# Patient Record
Sex: FEMALE | Race: WHITE | Employment: UNEMPLOYED
[De-identification: names, ages, dates, MRNs, and addresses within clinical notes are randomized per-mention and may not be internally consistent; named-entity substitution may affect disease eponyms.]

---

## 2017-01-23 ENCOUNTER — NURSE ONLY (OUTPATIENT)
Dept: OBGYN | Facility: CLINIC | Age: 22
End: 2017-01-23

## 2017-01-23 VITALS
DIASTOLIC BLOOD PRESSURE: 70 MMHG | HEART RATE: 78 BPM | HEIGHT: 66 IN | WEIGHT: 152 LBS | SYSTOLIC BLOOD PRESSURE: 118 MMHG | RESPIRATION RATE: 18 BRPM | BODY MASS INDEX: 24.43 KG/M2

## 2017-01-23 DIAGNOSIS — N94.6 DYSMENORRHEA: Primary | ICD-10-CM

## 2017-01-23 PROCEDURE — 96372 THER/PROPH/DIAG INJ SC/IM: CPT | Performed by: ADVANCED PRACTICE MIDWIFE

## 2017-01-23 RX ORDER — MEDROXYPROGESTERONE ACETATE 150 MG/ML
150 INJECTION, SUSPENSION INTRAMUSCULAR ONCE
Status: COMPLETED | OUTPATIENT
Start: 2017-01-23 | End: 2017-01-23

## 2017-01-23 RX ADMIN — MEDROXYPROGESTERONE ACETATE 150 MG: 150 INJECTION, SUSPENSION INTRAMUSCULAR at 16:43

## 2017-08-09 ENCOUNTER — NURSE ONLY (OUTPATIENT)
Dept: OBGYN | Age: 22
End: 2017-08-09
Payer: COMMERCIAL

## 2017-08-09 VITALS
WEIGHT: 148 LBS | BODY MASS INDEX: 23.78 KG/M2 | SYSTOLIC BLOOD PRESSURE: 108 MMHG | DIASTOLIC BLOOD PRESSURE: 65 MMHG | HEIGHT: 66 IN

## 2017-08-09 DIAGNOSIS — N94.6 DYSMENORRHEA: Primary | ICD-10-CM

## 2017-08-09 LAB
CONTROL: PRESENT
PREGNANCY TEST URINE, POC: NEGATIVE

## 2017-08-09 PROCEDURE — 81025 URINE PREGNANCY TEST: CPT | Performed by: OBSTETRICS & GYNECOLOGY

## 2017-08-09 PROCEDURE — 96372 THER/PROPH/DIAG INJ SC/IM: CPT | Performed by: OBSTETRICS & GYNECOLOGY

## 2017-08-09 RX ORDER — MEDROXYPROGESTERONE ACETATE 150 MG/ML
150 INJECTION, SUSPENSION INTRAMUSCULAR ONCE
Status: COMPLETED | OUTPATIENT
Start: 2017-08-09 | End: 2017-08-09

## 2017-08-09 RX ADMIN — MEDROXYPROGESTERONE ACETATE 150 MG: 150 INJECTION, SUSPENSION INTRAMUSCULAR at 14:03

## 2017-08-12 ENCOUNTER — APPOINTMENT (OUTPATIENT)
Dept: CT IMAGING | Age: 22
End: 2017-08-12
Payer: COMMERCIAL

## 2017-08-12 ENCOUNTER — HOSPITAL ENCOUNTER (EMERGENCY)
Age: 22
Discharge: HOME | End: 2017-08-12
Attending: EMERGENCY MEDICINE
Payer: COMMERCIAL

## 2017-08-12 VITALS
TEMPERATURE: 99.6 F | SYSTOLIC BLOOD PRESSURE: 111 MMHG | HEART RATE: 85 BPM | RESPIRATION RATE: 18 BRPM | OXYGEN SATURATION: 97 % | DIASTOLIC BLOOD PRESSURE: 85 MMHG

## 2017-08-12 DIAGNOSIS — G44.319 ACUTE POST-TRAUMATIC HEADACHE, NOT INTRACTABLE: Primary | ICD-10-CM

## 2017-08-12 PROCEDURE — 96374 THER/PROPH/DIAG INJ IV PUSH: CPT

## 2017-08-12 PROCEDURE — 70450 CT HEAD/BRAIN W/O DYE: CPT

## 2017-08-12 PROCEDURE — 96375 TX/PRO/DX INJ NEW DRUG ADDON: CPT

## 2017-08-12 PROCEDURE — 99284 EMERGENCY DEPT VISIT MOD MDM: CPT

## 2017-08-12 PROCEDURE — 6360000002 HC RX W HCPCS: Performed by: EMERGENCY MEDICINE

## 2017-08-12 RX ORDER — KETOROLAC TROMETHAMINE 30 MG/ML
30 INJECTION, SOLUTION INTRAMUSCULAR; INTRAVENOUS ONCE
Status: COMPLETED | OUTPATIENT
Start: 2017-08-12 | End: 2017-08-12

## 2017-08-12 RX ORDER — DIPHENHYDRAMINE HYDROCHLORIDE 50 MG/ML
25 INJECTION INTRAMUSCULAR; INTRAVENOUS ONCE
Status: COMPLETED | OUTPATIENT
Start: 2017-08-12 | End: 2017-08-12

## 2017-08-12 RX ADMIN — DIPHENHYDRAMINE HYDROCHLORIDE 25 MG: 50 INJECTION, SOLUTION INTRAMUSCULAR; INTRAVENOUS at 12:08

## 2017-08-12 RX ADMIN — KETOROLAC TROMETHAMINE 30 MG: 30 INJECTION, SOLUTION INTRAMUSCULAR at 12:08

## 2017-08-12 RX ADMIN — PROCHLORPERAZINE EDISYLATE 10 MG: 5 INJECTION INTRAMUSCULAR; INTRAVENOUS at 12:08

## 2017-08-12 ASSESSMENT — PAIN DESCRIPTION - LOCATION: LOCATION: HEAD

## 2017-08-12 ASSESSMENT — PAIN SCALES - GENERAL
PAINLEVEL_OUTOF10: 6
PAINLEVEL_OUTOF10: 6

## 2017-08-12 ASSESSMENT — PAIN DESCRIPTION - PAIN TYPE: TYPE: ACUTE PAIN

## 2018-03-20 ENCOUNTER — TELEPHONE (OUTPATIENT)
Dept: OBGYN | Age: 23
End: 2018-03-20

## 2018-03-20 DIAGNOSIS — Z32.01 POSITIVE URINE PREGNANCY TEST: Primary | ICD-10-CM

## 2018-04-19 ENCOUNTER — HOSPITAL ENCOUNTER (OUTPATIENT)
Age: 23
Setting detail: SPECIMEN
Discharge: HOME | End: 2018-04-19
Payer: COMMERCIAL

## 2018-04-19 ENCOUNTER — INITIAL PRENATAL (OUTPATIENT)
Dept: OBGYN | Age: 23
End: 2018-04-19
Payer: COMMERCIAL

## 2018-04-19 VITALS
HEART RATE: 88 BPM | DIASTOLIC BLOOD PRESSURE: 62 MMHG | BODY MASS INDEX: 27.44 KG/M2 | SYSTOLIC BLOOD PRESSURE: 102 MMHG | WEIGHT: 170 LBS

## 2018-04-19 DIAGNOSIS — O21.9 NAUSEA AND VOMITING DURING PREGNANCY: Primary | ICD-10-CM

## 2018-04-19 DIAGNOSIS — N91.2 AMENORRHEA: ICD-10-CM

## 2018-04-19 DIAGNOSIS — Z32.01 POSITIVE URINE PREGNANCY TEST: ICD-10-CM

## 2018-04-19 DIAGNOSIS — Z34.81 ENCOUNTER FOR SUPERVISION OF OTHER NORMAL PREGNANCY IN FIRST TRIMESTER: ICD-10-CM

## 2018-04-19 LAB
ABO/RH: NORMAL
AMPHETAMINE SCREEN URINE: NEGATIVE
ANTIBODY SCREEN: NEGATIVE
BARBITURATE SCREEN URINE: NEGATIVE
BENZODIAZEPINE SCREEN, URINE: NEGATIVE
BUPRENORPHINE URINE: NEGATIVE
CANNABINOID SCREEN URINE: NEGATIVE
COCAINE METABOLITE, URINE: NEGATIVE
CONTROL: ABNORMAL
MDMA URINE: NORMAL
METHADONE SCREEN, URINE: NEGATIVE
METHAMPHETAMINE, URINE: NEGATIVE
OPIATES, URINE: NEGATIVE
OXYCODONE SCREEN URINE: NEGATIVE
PHENCYCLIDINE, URINE: NEGATIVE
PREGNANCY TEST URINE, POC: POSITIVE
PROPOXYPHENE, URINE: NEGATIVE
TEST INFORMATION: NORMAL
TRICYCLIC ANTIDEPRESSANTS, UR: NEGATIVE

## 2018-04-19 PROCEDURE — 87591 N.GONORRHOEAE DNA AMP PROB: CPT

## 2018-04-19 PROCEDURE — 87389 HIV-1 AG W/HIV-1&-2 AB AG IA: CPT

## 2018-04-19 PROCEDURE — 87086 URINE CULTURE/COLONY COUNT: CPT

## 2018-04-19 PROCEDURE — 87340 HEPATITIS B SURFACE AG IA: CPT

## 2018-04-19 PROCEDURE — 59899 UNLISTED PX MAT CARE&DLVR: CPT | Performed by: ADVANCED PRACTICE MIDWIFE

## 2018-04-19 PROCEDURE — 86780 TREPONEMA PALLIDUM: CPT

## 2018-04-19 PROCEDURE — 86803 HEPATITIS C AB TEST: CPT

## 2018-04-19 PROCEDURE — 86901 BLOOD TYPING SEROLOGIC RH(D): CPT

## 2018-04-19 PROCEDURE — 99211 OFF/OP EST MAY X REQ PHY/QHP: CPT | Performed by: ADVANCED PRACTICE MIDWIFE

## 2018-04-19 PROCEDURE — 80306 DRUG TEST PRSMV INSTRMNT: CPT

## 2018-04-19 PROCEDURE — 86762 RUBELLA ANTIBODY: CPT

## 2018-04-19 PROCEDURE — 86850 RBC ANTIBODY SCREEN: CPT

## 2018-04-19 PROCEDURE — 81025 URINE PREGNANCY TEST: CPT | Performed by: ADVANCED PRACTICE MIDWIFE

## 2018-04-19 PROCEDURE — 87491 CHLMYD TRACH DNA AMP PROBE: CPT

## 2018-04-19 PROCEDURE — G8419 CALC BMI OUT NRM PARAM NOF/U: HCPCS | Performed by: ADVANCED PRACTICE MIDWIFE

## 2018-04-19 PROCEDURE — 85025 COMPLETE CBC W/AUTO DIFF WBC: CPT

## 2018-04-19 PROCEDURE — G8427 DOCREV CUR MEDS BY ELIG CLIN: HCPCS | Performed by: ADVANCED PRACTICE MIDWIFE

## 2018-04-19 PROCEDURE — 86900 BLOOD TYPING SEROLOGIC ABO: CPT

## 2018-04-19 PROCEDURE — 36415 COLL VENOUS BLD VENIPUNCTURE: CPT | Performed by: ADVANCED PRACTICE MIDWIFE

## 2018-04-19 RX ORDER — ONDANSETRON 4 MG/1
4 TABLET, FILM COATED ORAL EVERY 8 HOURS PRN
Qty: 20 TABLET | Refills: 2 | Status: ON HOLD | OUTPATIENT
Start: 2018-04-19 | End: 2018-11-21 | Stop reason: HOSPADM

## 2018-04-19 ASSESSMENT — PATIENT HEALTH QUESTIONNAIRE - PHQ9
SUM OF ALL RESPONSES TO PHQ9 QUESTIONS 1 & 2: 0
1. LITTLE INTEREST OR PLEASURE IN DOING THINGS: 0
2. FEELING DOWN, DEPRESSED OR HOPELESS: 0
SUM OF ALL RESPONSES TO PHQ QUESTIONS 1-9: 0

## 2018-04-20 LAB
ABSOLUTE EOS #: 0.07 K/UL (ref 0–0.44)
ABSOLUTE IMMATURE GRANULOCYTE: 0.03 K/UL (ref 0–0.3)
ABSOLUTE LYMPH #: 2.18 K/UL (ref 1.1–3.7)
ABSOLUTE MONO #: 0.51 K/UL (ref 0.1–1.2)
BASOPHILS # BLD: 1 % (ref 0–2)
BASOPHILS ABSOLUTE: 0.06 K/UL (ref 0–0.2)
CULTURE: NORMAL
CULTURE: NORMAL
DIFFERENTIAL TYPE: ABNORMAL
EOSINOPHILS RELATIVE PERCENT: 1 % (ref 1–4)
HCT VFR BLD CALC: 36.4 % (ref 36.3–47.1)
HEMOGLOBIN: 11.8 G/DL (ref 11.9–15.1)
HEPATITIS B SURFACE ANTIGEN: NONREACTIVE
HEPATITIS C ANTIBODY: NONREACTIVE
HIV AG/AB: NONREACTIVE
IMMATURE GRANULOCYTES: 0 %
LYMPHOCYTES # BLD: 24 % (ref 24–43)
Lab: NORMAL
Lab: NORMAL
MCH RBC QN AUTO: 31.7 PG (ref 25.2–33.5)
MCHC RBC AUTO-ENTMCNC: 32.4 G/DL (ref 28.4–34.8)
MCV RBC AUTO: 97.8 FL (ref 82.6–102.9)
MONOCYTES # BLD: 6 % (ref 3–12)
NRBC AUTOMATED: 0 PER 100 WBC
PDW BLD-RTO: 12 % (ref 11.8–14.4)
PLATELET # BLD: 229 K/UL (ref 138–453)
PLATELET ESTIMATE: ABNORMAL
PMV BLD AUTO: 10.5 FL (ref 8.1–13.5)
RBC # BLD: 3.72 M/UL (ref 3.95–5.11)
RBC # BLD: ABNORMAL 10*6/UL
RUBV IGG SER QL: 193 IU/ML
SEG NEUTROPHILS: 68 % (ref 36–65)
SEGMENTED NEUTROPHILS ABSOLUTE COUNT: 6.14 K/UL (ref 1.5–8.1)
SPECIMEN DESCRIPTION: NORMAL
SPECIMEN DESCRIPTION: NORMAL
STATUS: NORMAL
T. PALLIDUM, IGG: NONREACTIVE
WBC # BLD: 9 K/UL (ref 3.5–11.3)
WBC # BLD: ABNORMAL 10*3/UL

## 2018-04-23 LAB
C. TRACHOMATIS DNA ,URINE: NEGATIVE
N. GONORRHOEAE DNA, URINE: NEGATIVE

## 2018-05-10 ENCOUNTER — ROUTINE PRENATAL (OUTPATIENT)
Dept: OBGYN | Age: 23
End: 2018-05-10
Payer: COMMERCIAL

## 2018-05-10 VITALS — DIASTOLIC BLOOD PRESSURE: 60 MMHG | SYSTOLIC BLOOD PRESSURE: 98 MMHG | BODY MASS INDEX: 27.31 KG/M2 | WEIGHT: 169.2 LBS

## 2018-05-10 DIAGNOSIS — O36.80X0 PREGNANCY WITH UNCERTAIN FETAL VIABILITY, SINGLE OR UNSPECIFIED FETUS: Primary | ICD-10-CM

## 2018-05-10 DIAGNOSIS — Z3A.11 11 WEEKS GESTATION OF PREGNANCY: ICD-10-CM

## 2018-05-10 LAB
CRL: 3.78 CM
SAC DIAMETER: NORMAL CM

## 2018-05-10 PROCEDURE — 99213 OFFICE O/P EST LOW 20 MIN: CPT | Performed by: ADVANCED PRACTICE MIDWIFE

## 2018-05-10 PROCEDURE — G8419 CALC BMI OUT NRM PARAM NOF/U: HCPCS | Performed by: ADVANCED PRACTICE MIDWIFE

## 2018-05-10 PROCEDURE — 76801 OB US < 14 WKS SINGLE FETUS: CPT | Performed by: OBSTETRICS & GYNECOLOGY

## 2018-05-10 PROCEDURE — 1036F TOBACCO NON-USER: CPT | Performed by: ADVANCED PRACTICE MIDWIFE

## 2018-05-10 PROCEDURE — G8427 DOCREV CUR MEDS BY ELIG CLIN: HCPCS | Performed by: ADVANCED PRACTICE MIDWIFE

## 2018-06-12 ENCOUNTER — ROUTINE PRENATAL (OUTPATIENT)
Dept: OBGYN | Age: 23
End: 2018-06-12
Payer: COMMERCIAL

## 2018-06-12 ENCOUNTER — HOSPITAL ENCOUNTER (OUTPATIENT)
Age: 23
Setting detail: SPECIMEN
Discharge: HOME | End: 2018-06-12
Payer: COMMERCIAL

## 2018-06-12 VITALS — WEIGHT: 167.6 LBS | BODY MASS INDEX: 27.05 KG/M2 | DIASTOLIC BLOOD PRESSURE: 62 MMHG | SYSTOLIC BLOOD PRESSURE: 98 MMHG

## 2018-06-12 DIAGNOSIS — Z3A.16 16 WEEKS GESTATION OF PREGNANCY: Primary | ICD-10-CM

## 2018-06-12 DIAGNOSIS — Z98.891 HISTORY OF C-SECTION: ICD-10-CM

## 2018-06-12 DIAGNOSIS — Z3A.16 16 WEEKS GESTATION OF PREGNANCY: ICD-10-CM

## 2018-06-12 PROCEDURE — G8427 DOCREV CUR MEDS BY ELIG CLIN: HCPCS | Performed by: ADVANCED PRACTICE MIDWIFE

## 2018-06-12 PROCEDURE — 1036F TOBACCO NON-USER: CPT | Performed by: ADVANCED PRACTICE MIDWIFE

## 2018-06-12 PROCEDURE — G8419 CALC BMI OUT NRM PARAM NOF/U: HCPCS | Performed by: ADVANCED PRACTICE MIDWIFE

## 2018-06-12 PROCEDURE — G0145 SCR C/V CYTO,THINLAYER,RESCR: HCPCS

## 2018-06-12 PROCEDURE — 99213 OFFICE O/P EST LOW 20 MIN: CPT | Performed by: ADVANCED PRACTICE MIDWIFE

## 2018-06-30 LAB — CYTOLOGY REPORT: NORMAL

## 2018-07-12 ENCOUNTER — ROUTINE PRENATAL (OUTPATIENT)
Dept: OBGYN | Age: 23
End: 2018-07-12
Payer: COMMERCIAL

## 2018-07-12 VITALS — DIASTOLIC BLOOD PRESSURE: 62 MMHG | WEIGHT: 170.8 LBS | BODY MASS INDEX: 27.57 KG/M2 | SYSTOLIC BLOOD PRESSURE: 98 MMHG

## 2018-07-12 DIAGNOSIS — Z32.01 POSITIVE URINE PREGNANCY TEST: ICD-10-CM

## 2018-07-12 DIAGNOSIS — Z3A.20 20 WEEKS GESTATION OF PREGNANCY: Primary | ICD-10-CM

## 2018-07-12 DIAGNOSIS — Z36.89 SCREENING, ANTENATAL, FOR FETAL ANATOMIC SURVEY: ICD-10-CM

## 2018-07-12 DIAGNOSIS — Z98.891 HISTORY OF C-SECTION: ICD-10-CM

## 2018-07-12 LAB
ABDOMINAL CIRCUMFERENCE: 14.8 CM
BIPARIETAL DIAMETER: 4.85 CM
ESTIMATED FETAL WEIGHT: 358 GRAMS
FEMORAL DIAMETER: NORMAL CM
HC/AC: 1.24
HEAD CIRCUMFERENCE: 18.5 CM

## 2018-07-12 PROCEDURE — 1036F TOBACCO NON-USER: CPT | Performed by: ADVANCED PRACTICE MIDWIFE

## 2018-07-12 PROCEDURE — 76805 OB US >/= 14 WKS SNGL FETUS: CPT | Performed by: OBSTETRICS & GYNECOLOGY

## 2018-07-12 PROCEDURE — 36415 COLL VENOUS BLD VENIPUNCTURE: CPT | Performed by: ADVANCED PRACTICE MIDWIFE

## 2018-07-12 PROCEDURE — G8419 CALC BMI OUT NRM PARAM NOF/U: HCPCS | Performed by: ADVANCED PRACTICE MIDWIFE

## 2018-07-12 PROCEDURE — 99213 OFFICE O/P EST LOW 20 MIN: CPT | Performed by: ADVANCED PRACTICE MIDWIFE

## 2018-07-12 PROCEDURE — G8427 DOCREV CUR MEDS BY ELIG CLIN: HCPCS | Performed by: ADVANCED PRACTICE MIDWIFE

## 2018-07-12 NOTE — PROGRESS NOTES
Liana Early is here at [facility] for:    Chief Complaint   Patient presents with    Routine Prenatal Visit     review in house 20 week U/S; GIRL. no complaints. Estimated Due Date: Estimated Date of Delivery: 18    OB History    Para Term  AB Living   4 3 2 1   3   SAB TAB Ectopic Molar Multiple Live Births             3      # Outcome Date GA Lbr Taz/2nd Weight Sex Delivery Anes PTL Lv   4 Current            3 Term 04/10/15 39w0d  8 lb 4 oz (3.742 kg) M CS-LTranv Spinal N DAY   2 Term 14 39w2d  7 lb 6.6 oz (3.362 kg) M CS-LTranv Spinal N DAY   1  13 36w0d  6 lb 3.8 oz (2.829 kg) M CS-LTranv Spinal N DAY      Complications: Autism           Past Medical History:   Diagnosis Date    Herpes        Past Surgical History:   Procedure Laterality Date     SECTION, LOW TRANSVERSE  2013     SECTION, LOW TRANSVERSE  2014     SECTION, LOW TRANSVERSE  4/10/2015       History   Smoking Status    Former Smoker    Packs/day: 0.25    Years: 0.50    Quit date: 6/10/2014   Smokeless Tobacco    Never Used        History   Alcohol Use No       Results for orders placed or performed in visit on 18    OB 14 Plus Weeks Single or First Gestation   Result Value Ref Range    Biparietal Diameter 4.85 cm    Abdominal Circumference 14.8 cm    Femoral Diameter  cm    Head Circumference 18.5 cm    HC/AC 1.24     Estimated Fetal Weight 358 grams       Vitals:  BP 98/62   Wt 170 lb 12.8 oz (77.5 kg)   LMP 2018   BMI 27.57 kg/m²   Estimated body mass index is 27.57 kg/m² as calculated from the following:    Height as of 17: 5' 6\" (1.676 m). Weight as of this encounter: 170 lb 12.8 oz (77.5 kg).     Labs:    Results for orders placed or performed in visit on 18    OB 14 Plus Weeks Single or First Gestation   Result Value Ref Range    Biparietal Diameter 4.85 cm    Abdominal Circumference 14.8 cm    Femoral Diameter  cm

## 2018-08-13 ENCOUNTER — ROUTINE PRENATAL (OUTPATIENT)
Dept: OBGYN | Age: 23
End: 2018-08-13
Payer: COMMERCIAL

## 2018-08-13 ENCOUNTER — TELEPHONE (OUTPATIENT)
Dept: OBGYN | Age: 23
End: 2018-08-13

## 2018-08-13 VITALS — SYSTOLIC BLOOD PRESSURE: 98 MMHG | WEIGHT: 172.6 LBS | DIASTOLIC BLOOD PRESSURE: 64 MMHG | BODY MASS INDEX: 27.86 KG/M2

## 2018-08-13 DIAGNOSIS — O26.842 UTERINE SIZE DATE DISCREPANCY PREGNANCY, SECOND TRIMESTER: ICD-10-CM

## 2018-08-13 DIAGNOSIS — Z3A.25 25 WEEKS GESTATION OF PREGNANCY: Primary | ICD-10-CM

## 2018-08-13 DIAGNOSIS — Z98.891 HISTORY OF C-SECTION: ICD-10-CM

## 2018-08-13 DIAGNOSIS — Z3A.30 30 WEEKS GESTATION OF PREGNANCY: ICD-10-CM

## 2018-08-13 PROCEDURE — G8419 CALC BMI OUT NRM PARAM NOF/U: HCPCS | Performed by: ADVANCED PRACTICE MIDWIFE

## 2018-08-13 PROCEDURE — 1036F TOBACCO NON-USER: CPT | Performed by: ADVANCED PRACTICE MIDWIFE

## 2018-08-13 PROCEDURE — G8427 DOCREV CUR MEDS BY ELIG CLIN: HCPCS | Performed by: ADVANCED PRACTICE MIDWIFE

## 2018-08-13 PROCEDURE — 99213 OFFICE O/P EST LOW 20 MIN: CPT | Performed by: ADVANCED PRACTICE MIDWIFE

## 2018-08-13 NOTE — PROGRESS NOTES
US OB Follow Up Transabdominal Approach             I am having Ms. Ervin maintain her Prenatal MV-Min-Fe Fum-FA-DHA (PRENATAL 1 PO) and ondansetron. We will continue to administer medroxyPROGESTERone. Return in about 3 weeks (around 9/3/2018) for OB GTT & 5 weeks , OB 30 wk U/S with tDap. There are no Patient Instructions on file for this visit.           etta  Pool,8/13/2018 2:44 PM

## 2018-09-04 ENCOUNTER — HOSPITAL ENCOUNTER (OUTPATIENT)
Age: 23
Setting detail: SPECIMEN
Discharge: HOME | End: 2018-09-04
Payer: COMMERCIAL

## 2018-09-04 ENCOUNTER — ROUTINE PRENATAL (OUTPATIENT)
Dept: OBGYN | Age: 23
End: 2018-09-04
Payer: COMMERCIAL

## 2018-09-04 VITALS — BODY MASS INDEX: 27.92 KG/M2 | SYSTOLIC BLOOD PRESSURE: 102 MMHG | DIASTOLIC BLOOD PRESSURE: 72 MMHG | WEIGHT: 173 LBS

## 2018-09-04 DIAGNOSIS — Z98.891 HISTORY OF C-SECTION: ICD-10-CM

## 2018-09-04 DIAGNOSIS — Z3A.28 28 WEEKS GESTATION OF PREGNANCY: Primary | ICD-10-CM

## 2018-09-04 DIAGNOSIS — Z3A.28 28 WEEKS GESTATION OF PREGNANCY: ICD-10-CM

## 2018-09-04 LAB — HEMOGLOBIN: 11.3 G/DL (ref 11.9–15.1)

## 2018-09-04 PROCEDURE — 36415 COLL VENOUS BLD VENIPUNCTURE: CPT

## 2018-09-04 PROCEDURE — 36415 COLL VENOUS BLD VENIPUNCTURE: CPT | Performed by: ADVANCED PRACTICE MIDWIFE

## 2018-09-04 PROCEDURE — 99213 OFFICE O/P EST LOW 20 MIN: CPT | Performed by: ADVANCED PRACTICE MIDWIFE

## 2018-09-04 PROCEDURE — G8419 CALC BMI OUT NRM PARAM NOF/U: HCPCS | Performed by: ADVANCED PRACTICE MIDWIFE

## 2018-09-04 PROCEDURE — 1036F TOBACCO NON-USER: CPT | Performed by: ADVANCED PRACTICE MIDWIFE

## 2018-09-04 PROCEDURE — 83036 HEMOGLOBIN GLYCOSYLATED A1C: CPT

## 2018-09-04 PROCEDURE — 85018 HEMOGLOBIN: CPT

## 2018-09-04 PROCEDURE — G8427 DOCREV CUR MEDS BY ELIG CLIN: HCPCS | Performed by: ADVANCED PRACTICE MIDWIFE

## 2018-09-05 ENCOUNTER — TELEPHONE (OUTPATIENT)
Dept: OBGYN CLINIC | Age: 23
End: 2018-09-05

## 2018-09-05 DIAGNOSIS — Z3A.28 28 WEEKS GESTATION OF PREGNANCY: ICD-10-CM

## 2018-09-05 LAB
ESTIMATED AVERAGE GLUCOSE: 82 MG/DL
HBA1C MFR BLD: 4.5 % (ref 4.8–5.9)

## 2018-09-17 ENCOUNTER — ROUTINE PRENATAL (OUTPATIENT)
Dept: OBGYN | Age: 23
End: 2018-09-17
Payer: COMMERCIAL

## 2018-09-17 VITALS — SYSTOLIC BLOOD PRESSURE: 96 MMHG | BODY MASS INDEX: 27.92 KG/M2 | WEIGHT: 173 LBS | DIASTOLIC BLOOD PRESSURE: 60 MMHG

## 2018-09-17 DIAGNOSIS — Z3A.30 30 WEEKS GESTATION OF PREGNANCY: ICD-10-CM

## 2018-09-17 DIAGNOSIS — O26.842 UTERINE SIZE DATE DISCREPANCY PREGNANCY, SECOND TRIMESTER: ICD-10-CM

## 2018-09-17 LAB
ABDOMINAL CIRCUMFERENCE: 25.7 CM
BIPARIETAL DIAMETER: 8.07 CM
ESTIMATED FETAL WEIGHT: 1558 GRAMS
FEMORAL DIAMETER: NORMAL CM
HC/AC: 1.11
HEAD CIRCUMFERENCE: 10.07 CM

## 2018-09-17 PROCEDURE — 1036F TOBACCO NON-USER: CPT | Performed by: ADVANCED PRACTICE MIDWIFE

## 2018-09-17 PROCEDURE — 99213 OFFICE O/P EST LOW 20 MIN: CPT | Performed by: ADVANCED PRACTICE MIDWIFE

## 2018-09-17 PROCEDURE — 76816 OB US FOLLOW-UP PER FETUS: CPT | Performed by: OBSTETRICS & GYNECOLOGY

## 2018-09-17 PROCEDURE — G8419 CALC BMI OUT NRM PARAM NOF/U: HCPCS | Performed by: ADVANCED PRACTICE MIDWIFE

## 2018-09-17 PROCEDURE — G8428 CUR MEDS NOT DOCUMENT: HCPCS | Performed by: ADVANCED PRACTICE MIDWIFE

## 2018-09-17 NOTE — PROGRESS NOTES
HC/AC 1.11     Estimated Fetal Weight 1,558 grams       HPI: pt here today states she is doing well. U/s completed    Yes PT denies fever, chills, nausea and vomiting       Abdomen: enlarged, soft     See prenatal vital sign section and fetal assessment section    ASSESSMENT & Plan    Diagnosis Orders   1. 30 weeks gestation of pregnancy  US OB Follow Up Transabdominal Approach   2. Uterine size date discrepancy pregnancy, second trimester  US OB Follow Up Transabdominal Approach     Narrative   31.0 wk IUP    EFW- 53%   CL- 4.4 cm   BREANNE- 13.7 cm    bpm   Anterior gr 2 placenta, vertex presentation   Active fetal movement          Pt stated that efrem told her she would look with us at the next visit and see if she can get 3D pictures        I am having Ms. Ervin maintain her Prenatal MV-Min-Fe Fum-FA-DHA (PRENATAL 1 PO) and ondansetron. We will continue to administer medroxyPROGESTERone. Return in about 2 weeks (around 10/1/2018) for OB. There are no Patient Instructions on file for this visit.           Moni Romero,9/17/2018 9:39 AM

## 2018-10-01 ENCOUNTER — ROUTINE PRENATAL (OUTPATIENT)
Dept: OBGYN | Age: 23
End: 2018-10-01
Payer: COMMERCIAL

## 2018-10-01 VITALS — SYSTOLIC BLOOD PRESSURE: 122 MMHG | WEIGHT: 171 LBS | BODY MASS INDEX: 27.6 KG/M2 | DIASTOLIC BLOOD PRESSURE: 62 MMHG

## 2018-10-01 DIAGNOSIS — Z3A.32 32 WEEKS GESTATION OF PREGNANCY: Primary | ICD-10-CM

## 2018-10-01 DIAGNOSIS — Z98.891 HISTORY OF C-SECTION: ICD-10-CM

## 2018-10-01 PROCEDURE — 1036F TOBACCO NON-USER: CPT | Performed by: ADVANCED PRACTICE MIDWIFE

## 2018-10-01 PROCEDURE — 99213 OFFICE O/P EST LOW 20 MIN: CPT | Performed by: ADVANCED PRACTICE MIDWIFE

## 2018-10-01 PROCEDURE — G8427 DOCREV CUR MEDS BY ELIG CLIN: HCPCS | Performed by: ADVANCED PRACTICE MIDWIFE

## 2018-10-01 PROCEDURE — G8484 FLU IMMUNIZE NO ADMIN: HCPCS | Performed by: ADVANCED PRACTICE MIDWIFE

## 2018-10-01 PROCEDURE — G8419 CALC BMI OUT NRM PARAM NOF/U: HCPCS | Performed by: ADVANCED PRACTICE MIDWIFE

## 2018-10-16 ENCOUNTER — ROUTINE PRENATAL (OUTPATIENT)
Dept: OBGYN | Age: 23
End: 2018-10-16
Payer: COMMERCIAL

## 2018-10-16 VITALS — DIASTOLIC BLOOD PRESSURE: 62 MMHG | SYSTOLIC BLOOD PRESSURE: 102 MMHG | BODY MASS INDEX: 28.28 KG/M2 | WEIGHT: 175.2 LBS

## 2018-10-16 DIAGNOSIS — Z98.891 HISTORY OF C-SECTION: ICD-10-CM

## 2018-10-16 DIAGNOSIS — Z3A.34 34 WEEKS GESTATION OF PREGNANCY: Primary | ICD-10-CM

## 2018-10-16 PROCEDURE — G8419 CALC BMI OUT NRM PARAM NOF/U: HCPCS | Performed by: ADVANCED PRACTICE MIDWIFE

## 2018-10-16 PROCEDURE — 1036F TOBACCO NON-USER: CPT | Performed by: ADVANCED PRACTICE MIDWIFE

## 2018-10-16 PROCEDURE — G8484 FLU IMMUNIZE NO ADMIN: HCPCS | Performed by: ADVANCED PRACTICE MIDWIFE

## 2018-10-16 PROCEDURE — G8427 DOCREV CUR MEDS BY ELIG CLIN: HCPCS | Performed by: ADVANCED PRACTICE MIDWIFE

## 2018-10-16 PROCEDURE — 99213 OFFICE O/P EST LOW 20 MIN: CPT | Performed by: ADVANCED PRACTICE MIDWIFE

## 2018-10-16 NOTE — PROGRESS NOTES
Lázaro Diaz is here at 34w2d for:    Chief Complaint   Patient presents with    Routine Prenatal Visit     no complaints. Estimated Due Date: Estimated Date of Delivery: 18    OB History    Para Term  AB Living   4 3 2 1   3   SAB TAB Ectopic Molar Multiple Live Births             3      # Outcome Date GA Lbr Taz/2nd Weight Sex Delivery Anes PTL Lv   4 Current            3 Term 04/10/15 39w0d  8 lb 4 oz (3.742 kg) M CS-LTranv Spinal N DAY   2 Term 14 39w2d  7 lb 6.6 oz (3.362 kg) M CS-LTranv Spinal N DAY   1  13 36w0d  6 lb 3.8 oz (2.829 kg) M CS-LTranv Spinal N DAY      Complications: Autism           Past Medical History:   Diagnosis Date    Herpes        Past Surgical History:   Procedure Laterality Date     SECTION, LOW TRANSVERSE  2013     SECTION, LOW TRANSVERSE  2014     SECTION, LOW TRANSVERSE  4/10/2015       History   Smoking Status    Former Smoker    Packs/day: 0.25    Years: 0.50    Quit date: 6/10/2014   Smokeless Tobacco    Never Used        History   Alcohol Use No       No results found for this visit on 10/16/18. Vitals:  /62   Wt 175 lb 3.2 oz (79.5 kg)   LMP 2018   BMI 28.28 kg/m²   Estimated body mass index is 28.28 kg/m² as calculated from the following:    Height as of 17: 5' 6\" (1.676 m). Weight as of this encounter: 175 lb 3.2 oz (79.5 kg). Labs:    No results found for this visit on 10/16/18. HPI: pt here today states she doing well just confused as to when her C/S is      Yes PT denies fever, chills, nausea and vomiting       Abdomen: enlarged, 33 cm     See prenatal vital sign section and fetal assessment section    ASSESSMENT & Plan    Diagnosis Orders   1. 34 weeks gestation of pregnancy     2. History of        [facility] surgery verified time - was not in her chart correctly        I am having Ms. Ervin maintain her Prenatal MV-Min-Fe Fum-FA-DHA (PRENATAL 1 PO) and ondansetron. We will continue to administer medroxyPROGESTERone. Return in about 2 weeks (around 10/30/2018) for OB GBS C/S consent. There are no Patient Instructions on file for this visit.           Ramiro Romero,10/16/2018 9:52 AM

## 2018-11-15 ENCOUNTER — ROUTINE PRENATAL (OUTPATIENT)
Dept: OBGYN | Age: 23
End: 2018-11-15
Payer: COMMERCIAL

## 2018-11-15 VITALS — WEIGHT: 175.2 LBS | SYSTOLIC BLOOD PRESSURE: 110 MMHG | BODY MASS INDEX: 28.28 KG/M2 | DIASTOLIC BLOOD PRESSURE: 72 MMHG

## 2018-11-15 DIAGNOSIS — Z98.891 HISTORY OF C-SECTION: ICD-10-CM

## 2018-11-15 DIAGNOSIS — Z3A.38 38 WEEKS GESTATION OF PREGNANCY: Primary | ICD-10-CM

## 2018-11-15 PROCEDURE — G8484 FLU IMMUNIZE NO ADMIN: HCPCS | Performed by: ADVANCED PRACTICE MIDWIFE

## 2018-11-15 PROCEDURE — 1036F TOBACCO NON-USER: CPT | Performed by: ADVANCED PRACTICE MIDWIFE

## 2018-11-15 PROCEDURE — G8427 DOCREV CUR MEDS BY ELIG CLIN: HCPCS | Performed by: ADVANCED PRACTICE MIDWIFE

## 2018-11-15 PROCEDURE — G8419 CALC BMI OUT NRM PARAM NOF/U: HCPCS | Performed by: ADVANCED PRACTICE MIDWIFE

## 2018-11-15 PROCEDURE — 99213 OFFICE O/P EST LOW 20 MIN: CPT | Performed by: ADVANCED PRACTICE MIDWIFE

## 2018-11-16 ENCOUNTER — ANESTHESIA EVENT (OUTPATIENT)
Dept: LABOR AND DELIVERY | Age: 23
DRG: 540 | End: 2018-11-16
Payer: COMMERCIAL

## 2018-11-16 RX ORDER — SODIUM CHLORIDE 0.9 % (FLUSH) 0.9 %
10 SYRINGE (ML) INJECTION EVERY 12 HOURS SCHEDULED
Status: CANCELLED | OUTPATIENT
Start: 2018-11-16

## 2018-11-16 RX ORDER — SODIUM CHLORIDE 0.9 % (FLUSH) 0.9 %
10 SYRINGE (ML) INJECTION PRN
Status: CANCELLED | OUTPATIENT
Start: 2018-11-16

## 2018-11-16 RX ORDER — SODIUM CHLORIDE, SODIUM LACTATE, POTASSIUM CHLORIDE, CALCIUM CHLORIDE 600; 310; 30; 20 MG/100ML; MG/100ML; MG/100ML; MG/100ML
INJECTION, SOLUTION INTRAVENOUS CONTINUOUS
Status: CANCELLED | OUTPATIENT
Start: 2018-11-16

## 2018-11-18 ENCOUNTER — HOSPITAL ENCOUNTER (OUTPATIENT)
Age: 23
Setting detail: SURGERY ADMIT
Discharge: HOME | DRG: 540 | End: 2018-11-18
Attending: OBSTETRICS & GYNECOLOGY | Admitting: OBSTETRICS & GYNECOLOGY
Payer: COMMERCIAL

## 2018-11-18 DIAGNOSIS — Z01.818 ENCOUNTER FOR PREADMISSION TESTING: Primary | ICD-10-CM

## 2018-11-18 LAB
ABO/RH: NORMAL
ABSOLUTE EOS #: 0.2 K/UL (ref 0–0.44)
ABSOLUTE IMMATURE GRANULOCYTE: <0.03 K/UL (ref 0–0.3)
ABSOLUTE LYMPH #: 2.08 K/UL (ref 1.1–3.7)
ABSOLUTE MONO #: 0.41 K/UL (ref 0.1–1.2)
ANTIBODY SCREEN: NEGATIVE
ARM BAND NUMBER: NORMAL
BASOPHILS # BLD: 1 % (ref 0–2)
BASOPHILS ABSOLUTE: 0.06 K/UL (ref 0–0.2)
DIFFERENTIAL TYPE: ABNORMAL
EOSINOPHILS RELATIVE PERCENT: 3 % (ref 1–4)
EXPIRATION DATE: NORMAL
HCT VFR BLD CALC: 32.1 % (ref 36.3–47.1)
HEMOGLOBIN: 10.7 G/DL (ref 11.9–15.1)
IMMATURE GRANULOCYTES: 0 %
LYMPHOCYTES # BLD: 28 % (ref 24–43)
MCH RBC QN AUTO: 32.2 PG (ref 25.2–33.5)
MCHC RBC AUTO-ENTMCNC: 33.3 G/DL (ref 28.4–34.8)
MCV RBC AUTO: 96.7 FL (ref 82.6–102.9)
MONOCYTES # BLD: 6 % (ref 3–12)
NRBC AUTOMATED: 0 PER 100 WBC
PDW BLD-RTO: 12.3 % (ref 11.8–14.4)
PLATELET # BLD: 207 K/UL (ref 138–453)
PLATELET ESTIMATE: ABNORMAL
PMV BLD AUTO: 10.1 FL (ref 8.1–13.5)
RBC # BLD: 3.32 M/UL (ref 3.95–5.11)
RBC # BLD: ABNORMAL 10*6/UL
SEG NEUTROPHILS: 62 % (ref 36–65)
SEGMENTED NEUTROPHILS ABSOLUTE COUNT: 4.6 K/UL (ref 1.5–8.1)
WBC # BLD: 7.4 K/UL (ref 3.5–11.3)
WBC # BLD: ABNORMAL 10*3/UL

## 2018-11-18 PROCEDURE — 86850 RBC ANTIBODY SCREEN: CPT

## 2018-11-18 PROCEDURE — 36415 COLL VENOUS BLD VENIPUNCTURE: CPT

## 2018-11-18 PROCEDURE — 86901 BLOOD TYPING SEROLOGIC RH(D): CPT

## 2018-11-18 PROCEDURE — 85025 COMPLETE CBC W/AUTO DIFF WBC: CPT

## 2018-11-18 PROCEDURE — 86900 BLOOD TYPING SEROLOGIC ABO: CPT

## 2018-11-19 ENCOUNTER — HOSPITAL ENCOUNTER (INPATIENT)
Age: 23
LOS: 2 days | Discharge: HOME | DRG: 540 | End: 2018-11-21
Attending: OBSTETRICS & GYNECOLOGY | Admitting: OBSTETRICS & GYNECOLOGY
Payer: COMMERCIAL

## 2018-11-19 ENCOUNTER — ANESTHESIA (OUTPATIENT)
Dept: LABOR AND DELIVERY | Age: 23
DRG: 540 | End: 2018-11-19
Payer: COMMERCIAL

## 2018-11-19 VITALS — OXYGEN SATURATION: 99 % | SYSTOLIC BLOOD PRESSURE: 97 MMHG | DIASTOLIC BLOOD PRESSURE: 59 MMHG

## 2018-11-19 DIAGNOSIS — Z98.891 S/P REPEAT LOW TRANSVERSE C-SECTION: Primary | ICD-10-CM

## 2018-11-19 PROBLEM — Z34.90 TERM PREGNANCY: Status: ACTIVE | Noted: 2018-11-19

## 2018-11-19 LAB
AMPHETAMINE SCREEN URINE: NEGATIVE
BARBITURATE SCREEN URINE: NEGATIVE
BENZODIAZEPINE SCREEN, URINE: NEGATIVE
BUPRENORPHINE URINE: NEGATIVE
CANNABINOID SCREEN URINE: NEGATIVE
COCAINE METABOLITE, URINE: NEGATIVE
MDMA URINE: NORMAL
METHADONE SCREEN, URINE: NEGATIVE
METHAMPHETAMINE, URINE: NEGATIVE
OPIATES, URINE: NEGATIVE
OXYCODONE SCREEN URINE: NEGATIVE
PHENCYCLIDINE, URINE: NEGATIVE
PROPOXYPHENE, URINE: NEGATIVE
TEST INFORMATION: NORMAL
TRICYCLIC ANTIDEPRESSANTS, UR: NEGATIVE

## 2018-11-19 PROCEDURE — 6370000000 HC RX 637 (ALT 250 FOR IP): Performed by: OBSTETRICS & GYNECOLOGY

## 2018-11-19 PROCEDURE — 3700000001 HC ADD 15 MINUTES (ANESTHESIA): Performed by: OBSTETRICS & GYNECOLOGY

## 2018-11-19 PROCEDURE — 59514 CESAREAN DELIVERY ONLY: CPT | Performed by: ADVANCED PRACTICE MIDWIFE

## 2018-11-19 PROCEDURE — 2500000003 HC RX 250 WO HCPCS: Performed by: NURSE ANESTHETIST, CERTIFIED REGISTERED

## 2018-11-19 PROCEDURE — 3609079900 HC CESAREAN SECTION: Performed by: OBSTETRICS & GYNECOLOGY

## 2018-11-19 PROCEDURE — S0028 INJECTION, FAMOTIDINE, 20 MG: HCPCS | Performed by: OBSTETRICS & GYNECOLOGY

## 2018-11-19 PROCEDURE — 3700000000 HC ANESTHESIA ATTENDED CARE: Performed by: OBSTETRICS & GYNECOLOGY

## 2018-11-19 PROCEDURE — 59514 CESAREAN DELIVERY ONLY: CPT | Performed by: OBSTETRICS & GYNECOLOGY

## 2018-11-19 PROCEDURE — 2580000003 HC RX 258: Performed by: OBSTETRICS & GYNECOLOGY

## 2018-11-19 PROCEDURE — 7100000000 HC PACU RECOVERY - FIRST 15 MIN: Performed by: OBSTETRICS & GYNECOLOGY

## 2018-11-19 PROCEDURE — 7100000001 HC PACU RECOVERY - ADDTL 15 MIN: Performed by: OBSTETRICS & GYNECOLOGY

## 2018-11-19 PROCEDURE — 2580000003 HC RX 258: Performed by: ADVANCED PRACTICE MIDWIFE

## 2018-11-19 PROCEDURE — 80306 DRUG TEST PRSMV INSTRMNT: CPT

## 2018-11-19 PROCEDURE — 6360000002 HC RX W HCPCS: Performed by: NURSE ANESTHETIST, CERTIFIED REGISTERED

## 2018-11-19 PROCEDURE — 51702 INSERT TEMP BLADDER CATH: CPT

## 2018-11-19 PROCEDURE — 6370000000 HC RX 637 (ALT 250 FOR IP): Performed by: ADVANCED PRACTICE MIDWIFE

## 2018-11-19 PROCEDURE — 6360000002 HC RX W HCPCS: Performed by: OBSTETRICS & GYNECOLOGY

## 2018-11-19 PROCEDURE — 6360000002 HC RX W HCPCS: Performed by: ADVANCED PRACTICE MIDWIFE

## 2018-11-19 PROCEDURE — 2500000003 HC RX 250 WO HCPCS: Performed by: OBSTETRICS & GYNECOLOGY

## 2018-11-19 PROCEDURE — 1220000000 HC SEMI PRIVATE OB R&B

## 2018-11-19 PROCEDURE — 2709999900 HC NON-CHARGEABLE SUPPLY: Performed by: OBSTETRICS & GYNECOLOGY

## 2018-11-19 PROCEDURE — 64488 TAP BLOCK BI INJECTION: CPT | Performed by: NURSE ANESTHETIST, CERTIFIED REGISTERED

## 2018-11-19 RX ORDER — ONDANSETRON 2 MG/ML
4 INJECTION INTRAMUSCULAR; INTRAVENOUS EVERY 6 HOURS PRN
Status: DISCONTINUED | OUTPATIENT
Start: 2018-11-19 | End: 2018-11-21 | Stop reason: HOSPADM

## 2018-11-19 RX ORDER — SODIUM CHLORIDE 0.9 % (FLUSH) 0.9 %
10 SYRINGE (ML) INJECTION EVERY 12 HOURS SCHEDULED
Status: DISCONTINUED | OUTPATIENT
Start: 2018-11-19 | End: 2018-11-19

## 2018-11-19 RX ORDER — SIMETHICONE 80 MG
80 TABLET,CHEWABLE ORAL EVERY 6 HOURS PRN
Status: DISCONTINUED | OUTPATIENT
Start: 2018-11-19 | End: 2018-11-21 | Stop reason: HOSPADM

## 2018-11-19 RX ORDER — CARBOPROST TROMETHAMINE 250 UG/ML
250 INJECTION, SOLUTION INTRAMUSCULAR PRN
Status: DISCONTINUED | OUTPATIENT
Start: 2018-11-19 | End: 2018-11-21 | Stop reason: HOSPADM

## 2018-11-19 RX ORDER — DIPHENHYDRAMINE HYDROCHLORIDE 50 MG/ML
25 INJECTION INTRAMUSCULAR; INTRAVENOUS EVERY 6 HOURS PRN
Status: DISCONTINUED | OUTPATIENT
Start: 2018-11-19 | End: 2018-11-21 | Stop reason: HOSPADM

## 2018-11-19 RX ORDER — NALOXONE HYDROCHLORIDE 0.4 MG/ML
0.4 INJECTION, SOLUTION INTRAMUSCULAR; INTRAVENOUS; SUBCUTANEOUS PRN
Status: DISCONTINUED | OUTPATIENT
Start: 2018-11-19 | End: 2018-11-21 | Stop reason: HOSPADM

## 2018-11-19 RX ORDER — METHYLERGONOVINE MALEATE 0.2 MG/ML
200 INJECTION INTRAVENOUS PRN
Status: DISCONTINUED | OUTPATIENT
Start: 2018-11-19 | End: 2018-11-21 | Stop reason: HOSPADM

## 2018-11-19 RX ORDER — GUAIFENESIN/DEXTROMETHORPHAN 100-10MG/5
5 SYRUP ORAL EVERY 4 HOURS PRN
Status: DISCONTINUED | OUTPATIENT
Start: 2018-11-19 | End: 2018-11-21 | Stop reason: HOSPADM

## 2018-11-19 RX ORDER — DEXAMETHASONE SODIUM PHOSPHATE 10 MG/ML
INJECTION INTRAMUSCULAR; INTRAVENOUS PRN
Status: DISCONTINUED | OUTPATIENT
Start: 2018-11-19 | End: 2018-11-19 | Stop reason: SDUPTHER

## 2018-11-19 RX ORDER — SODIUM CHLORIDE, SODIUM LACTATE, POTASSIUM CHLORIDE, CALCIUM CHLORIDE 600; 310; 30; 20 MG/100ML; MG/100ML; MG/100ML; MG/100ML
INJECTION, SOLUTION INTRAVENOUS CONTINUOUS
Status: DISCONTINUED | OUTPATIENT
Start: 2018-11-19 | End: 2018-11-21 | Stop reason: HOSPADM

## 2018-11-19 RX ORDER — BUPIVACAINE HYDROCHLORIDE 7.5 MG/ML
INJECTION, SOLUTION INTRASPINAL PRN
Status: DISCONTINUED | OUTPATIENT
Start: 2018-11-19 | End: 2018-11-19 | Stop reason: SDUPTHER

## 2018-11-19 RX ORDER — SODIUM CHLORIDE 0.9 % (FLUSH) 0.9 %
10 SYRINGE (ML) INJECTION PRN
Status: DISCONTINUED | OUTPATIENT
Start: 2018-11-19 | End: 2018-11-19

## 2018-11-19 RX ORDER — PSEUDOEPHEDRINE HYDROCHLORIDE 30 MG/1
60 TABLET ORAL EVERY 4 HOURS PRN
Status: DISCONTINUED | OUTPATIENT
Start: 2018-11-19 | End: 2018-11-21 | Stop reason: HOSPADM

## 2018-11-19 RX ORDER — NALBUPHINE HCL 10 MG/ML
10 AMPUL (ML) INJECTION EVERY 4 HOURS PRN
Status: DISCONTINUED | OUTPATIENT
Start: 2018-11-19 | End: 2018-11-21 | Stop reason: HOSPADM

## 2018-11-19 RX ORDER — SODIUM CHLORIDE, SODIUM LACTATE, POTASSIUM CHLORIDE, CALCIUM CHLORIDE 600; 310; 30; 20 MG/100ML; MG/100ML; MG/100ML; MG/100ML
INJECTION, SOLUTION INTRAVENOUS CONTINUOUS
Status: DISCONTINUED | OUTPATIENT
Start: 2018-11-19 | End: 2018-11-19

## 2018-11-19 RX ORDER — ACETAMINOPHEN 325 MG/1
650 TABLET ORAL EVERY 4 HOURS PRN
Status: DISCONTINUED | OUTPATIENT
Start: 2018-11-19 | End: 2018-11-21 | Stop reason: HOSPADM

## 2018-11-19 RX ORDER — MISOPROSTOL 100 UG/1
800 TABLET ORAL PRN
Status: DISCONTINUED | OUTPATIENT
Start: 2018-11-19 | End: 2018-11-21 | Stop reason: HOSPADM

## 2018-11-19 RX ORDER — ACETAMINOPHEN 10 MG/ML
INJECTION, SOLUTION INTRAVENOUS PRN
Status: DISCONTINUED | OUTPATIENT
Start: 2018-11-19 | End: 2018-11-19 | Stop reason: SDUPTHER

## 2018-11-19 RX ORDER — OXYCODONE HYDROCHLORIDE AND ACETAMINOPHEN 5; 325 MG/1; MG/1
2 TABLET ORAL EVERY 4 HOURS PRN
Status: DISCONTINUED | OUTPATIENT
Start: 2018-11-19 | End: 2018-11-21 | Stop reason: HOSPADM

## 2018-11-19 RX ORDER — SODIUM CHLORIDE 0.9 % (FLUSH) 0.9 %
10 SYRINGE (ML) INJECTION EVERY 12 HOURS SCHEDULED
Status: DISCONTINUED | OUTPATIENT
Start: 2018-11-19 | End: 2018-11-21 | Stop reason: HOSPADM

## 2018-11-19 RX ORDER — MIDAZOLAM HYDROCHLORIDE 1 MG/ML
INJECTION INTRAMUSCULAR; INTRAVENOUS PRN
Status: DISCONTINUED | OUTPATIENT
Start: 2018-11-19 | End: 2018-11-19 | Stop reason: SDUPTHER

## 2018-11-19 RX ORDER — ONDANSETRON 2 MG/ML
INJECTION INTRAMUSCULAR; INTRAVENOUS PRN
Status: DISCONTINUED | OUTPATIENT
Start: 2018-11-19 | End: 2018-11-19 | Stop reason: SDUPTHER

## 2018-11-19 RX ORDER — TRISODIUM CITRATE DIHYDRATE AND CITRIC ACID MONOHYDRATE 500; 334 MG/5ML; MG/5ML
30 SOLUTION ORAL ONCE
Status: COMPLETED | OUTPATIENT
Start: 2018-11-19 | End: 2018-11-19

## 2018-11-19 RX ORDER — DOCUSATE SODIUM 100 MG/1
100 CAPSULE, LIQUID FILLED ORAL 2 TIMES DAILY
Status: DISCONTINUED | OUTPATIENT
Start: 2018-11-19 | End: 2018-11-21 | Stop reason: HOSPADM

## 2018-11-19 RX ORDER — METOCLOPRAMIDE HYDROCHLORIDE 5 MG/ML
10 INJECTION INTRAMUSCULAR; INTRAVENOUS ONCE
Status: COMPLETED | OUTPATIENT
Start: 2018-11-19 | End: 2018-11-19

## 2018-11-19 RX ORDER — KETOROLAC TROMETHAMINE 30 MG/ML
30 INJECTION, SOLUTION INTRAMUSCULAR; INTRAVENOUS EVERY 6 HOURS
Status: DISPENSED | OUTPATIENT
Start: 2018-11-19 | End: 2018-11-20

## 2018-11-19 RX ORDER — LANOLIN 100 %
OINTMENT (GRAM) TOPICAL
Status: DISCONTINUED | OUTPATIENT
Start: 2018-11-19 | End: 2018-11-21 | Stop reason: HOSPADM

## 2018-11-19 RX ORDER — PRENATAL WITH FERROUS FUM AND FOLIC ACID 3080; 920; 120; 400; 22; 1.84; 3; 20; 10; 1; 12; 200; 27; 25; 2 [IU]/1; [IU]/1; MG/1; [IU]/1; MG/1; MG/1; MG/1; MG/1; MG/1; MG/1; UG/1; MG/1; MG/1; MG/1; MG/1
1 TABLET ORAL DAILY
Status: DISCONTINUED | OUTPATIENT
Start: 2018-11-19 | End: 2018-11-21 | Stop reason: HOSPADM

## 2018-11-19 RX ORDER — DEXAMETHASONE SODIUM PHOSPHATE 4 MG/ML
INJECTION, SOLUTION INTRA-ARTICULAR; INTRALESIONAL; INTRAMUSCULAR; INTRAVENOUS; SOFT TISSUE PRN
Status: DISCONTINUED | OUTPATIENT
Start: 2018-11-19 | End: 2018-11-19 | Stop reason: SDUPTHER

## 2018-11-19 RX ORDER — SODIUM CHLORIDE 0.9 % (FLUSH) 0.9 %
10 SYRINGE (ML) INJECTION PRN
Status: DISCONTINUED | OUTPATIENT
Start: 2018-11-19 | End: 2018-11-21 | Stop reason: HOSPADM

## 2018-11-19 RX ORDER — SENNA AND DOCUSATE SODIUM 50; 8.6 MG/1; MG/1
1 TABLET, FILM COATED ORAL DAILY PRN
Status: DISCONTINUED | OUTPATIENT
Start: 2018-11-19 | End: 2018-11-21 | Stop reason: HOSPADM

## 2018-11-19 RX ORDER — KETOROLAC TROMETHAMINE 30 MG/ML
INJECTION, SOLUTION INTRAMUSCULAR; INTRAVENOUS PRN
Status: DISCONTINUED | OUTPATIENT
Start: 2018-11-19 | End: 2018-11-19 | Stop reason: SDUPTHER

## 2018-11-19 RX ORDER — LIDOCAINE HYDROCHLORIDE 20 MG/ML
INJECTION, SOLUTION EPIDURAL; INFILTRATION; INTRACAUDAL; PERINEURAL PRN
Status: DISCONTINUED | OUTPATIENT
Start: 2018-11-19 | End: 2018-11-19 | Stop reason: SDUPTHER

## 2018-11-19 RX ORDER — OXYCODONE HYDROCHLORIDE AND ACETAMINOPHEN 5; 325 MG/1; MG/1
1 TABLET ORAL EVERY 4 HOURS PRN
Status: DISCONTINUED | OUTPATIENT
Start: 2018-11-19 | End: 2018-11-21 | Stop reason: HOSPADM

## 2018-11-19 RX ORDER — IBUPROFEN 800 MG/1
800 TABLET ORAL EVERY 8 HOURS
Status: DISCONTINUED | OUTPATIENT
Start: 2018-11-20 | End: 2018-11-21 | Stop reason: HOSPADM

## 2018-11-19 RX ORDER — ROPIVACAINE HYDROCHLORIDE 5 MG/ML
INJECTION, SOLUTION EPIDURAL; INFILTRATION; PERINEURAL PRN
Status: DISCONTINUED | OUTPATIENT
Start: 2018-11-19 | End: 2018-11-19 | Stop reason: SDUPTHER

## 2018-11-19 RX ADMIN — LIDOCAINE HYDROCHLORIDE 3 ML: 20 INJECTION, SOLUTION EPIDURAL; INFILTRATION; INTRACAUDAL at 09:20

## 2018-11-19 RX ADMIN — VITAMIN A, VITAMIN C, VITAMIN D-3, VITAMIN E, VITAMIN B-1, VITAMIN B-2, NIACIN, VITAMIN B-6, CALCIUM, IRON, ZINC, COPPER 1 TABLET: 4000; 120; 400; 22; 1.84; 3; 20; 10; 1; 12; 200; 27; 25; 2 TABLET ORAL at 16:45

## 2018-11-19 RX ADMIN — SODIUM CHLORIDE, POTASSIUM CHLORIDE, SODIUM LACTATE AND CALCIUM CHLORIDE: 600; 310; 30; 20 INJECTION, SOLUTION INTRAVENOUS at 06:36

## 2018-11-19 RX ADMIN — SODIUM CITRATE AND CITRIC ACID MONOHYDRATE 30 ML: 500; 334 SOLUTION ORAL at 08:42

## 2018-11-19 RX ADMIN — GUAIFENESIN AND DEXTROMETHORPHAN 5 ML: 100; 10 SYRUP ORAL at 16:52

## 2018-11-19 RX ADMIN — BUPIVACAINE HYDROCHLORIDE 1.6 ML: 7.5 INJECTION, SOLUTION SUBARACHNOID at 09:23

## 2018-11-19 RX ADMIN — OXYCODONE AND ACETAMINOPHEN 1 TABLET: 5; 325 TABLET ORAL at 12:54

## 2018-11-19 RX ADMIN — Medication 10 ML: at 22:59

## 2018-11-19 RX ADMIN — ACETAMINOPHEN 1000 MG: 10 INJECTION, SOLUTION INTRAVENOUS at 09:44

## 2018-11-19 RX ADMIN — DEXAMETHASONE SODIUM PHOSPHATE 5 MG: 10 INJECTION INTRAMUSCULAR; INTRAVENOUS at 10:05

## 2018-11-19 RX ADMIN — ONDANSETRON 4 MG: 2 INJECTION INTRAMUSCULAR; INTRAVENOUS at 09:44

## 2018-11-19 RX ADMIN — Medication 1 MILLI-UNITS/MIN: at 10:32

## 2018-11-19 RX ADMIN — KETOROLAC TROMETHAMINE 30 MG: 30 INJECTION, SOLUTION INTRAMUSCULAR at 16:34

## 2018-11-19 RX ADMIN — MIDAZOLAM HYDROCHLORIDE 2 MG: 1 INJECTION, SOLUTION INTRAMUSCULAR; INTRAVENOUS at 09:42

## 2018-11-19 RX ADMIN — ROPIVACAINE HYDROCHLORIDE 20 ML: 5 INJECTION, SOLUTION EPIDURAL; INFILTRATION; PERINEURAL at 10:02

## 2018-11-19 RX ADMIN — Medication 166 MILLI-UNITS/MIN: at 18:24

## 2018-11-19 RX ADMIN — SODIUM CHLORIDE, POTASSIUM CHLORIDE, SODIUM LACTATE AND CALCIUM CHLORIDE: 600; 310; 30; 20 INJECTION, SOLUTION INTRAVENOUS at 08:43

## 2018-11-19 RX ADMIN — DOCUSATE SODIUM 100 MG: 100 CAPSULE, LIQUID FILLED ORAL at 22:59

## 2018-11-19 RX ADMIN — KETOROLAC TROMETHAMINE 30 MG: 30 INJECTION, SOLUTION INTRAMUSCULAR at 22:59

## 2018-11-19 RX ADMIN — DEXAMETHASONE SODIUM PHOSPHATE 8 MG: 4 INJECTION, SOLUTION INTRAMUSCULAR; INTRAVENOUS at 09:44

## 2018-11-19 RX ADMIN — GUAIFENESIN AND DEXTROMETHORPHAN 5 ML: 100; 10 SYRUP ORAL at 12:02

## 2018-11-19 RX ADMIN — KETOROLAC TROMETHAMINE 30 MG: 30 INJECTION, SOLUTION INTRAMUSCULAR; INTRAVENOUS at 10:08

## 2018-11-19 RX ADMIN — ROPIVACAINE HYDROCHLORIDE 20 ML: 5 INJECTION, SOLUTION EPIDURAL; INFILTRATION; PERINEURAL at 10:05

## 2018-11-19 RX ADMIN — FAMOTIDINE 20 MG: 10 INJECTION, SOLUTION INTRAVENOUS at 08:42

## 2018-11-19 RX ADMIN — GUAIFENESIN AND DEXTROMETHORPHAN 5 ML: 100; 10 SYRUP ORAL at 23:09

## 2018-11-19 RX ADMIN — PHENYLEPHRINE HYDROCHLORIDE 100 MCG: 10 INJECTION INTRAVENOUS at 09:25

## 2018-11-19 RX ADMIN — METOCLOPRAMIDE 10 MG: 5 INJECTION, SOLUTION INTRAMUSCULAR; INTRAVENOUS at 08:42

## 2018-11-19 RX ADMIN — CEFOXITIN SODIUM 2 G: 2 POWDER, FOR SOLUTION INTRAVENOUS at 09:05

## 2018-11-19 RX ADMIN — PHENYLEPHRINE HYDROCHLORIDE 100 MCG: 10 INJECTION INTRAVENOUS at 09:38

## 2018-11-19 RX ADMIN — SODIUM CHLORIDE, POTASSIUM CHLORIDE, SODIUM LACTATE AND CALCIUM CHLORIDE: 600; 310; 30; 20 INJECTION, SOLUTION INTRAVENOUS at 12:59

## 2018-11-19 RX ADMIN — DOCUSATE SODIUM 100 MG: 100 CAPSULE, LIQUID FILLED ORAL at 16:45

## 2018-11-19 RX ADMIN — DEXAMETHASONE SODIUM PHOSPHATE 5 MG: 10 INJECTION INTRAMUSCULAR; INTRAVENOUS at 10:02

## 2018-11-19 ASSESSMENT — PULMONARY FUNCTION TESTS
PIF_VALUE: 0
PIF_VALUE: 1
PIF_VALUE: 0
PIF_VALUE: 1
PIF_VALUE: 0

## 2018-11-19 ASSESSMENT — PAIN SCALES - GENERAL
PAINLEVEL_OUTOF10: 5
PAINLEVEL_OUTOF10: 5
PAINLEVEL_OUTOF10: 0

## 2018-11-19 NOTE — OP NOTE
abdominal wall fascia. So a careful inferior dissection  was undertaken to at last the lower uterine segment was visualized. There was noted to be a very large window and that no incision was  necessary to be made into the uterus. 's fingers did extend  this window readily. Light meconium fluid was noted. Membranes were  ruptured. Head was elevated. Fundal pressure placed, and the infant  was then delivered without any difficulty. Nares and oropharynx were  suctioned. The after cord pulsations stopped within 30 seconds of  delivery. The cord was clamped and cut, and infant handed off to  nursing staff attending delivery. Cord blood specimen was obtained, and  the placenta was carefully removed from the uterus including blood clots  and membranes, and then the cervix was carefully opened as it was  tightly closed. There was noted to be one artery in the endometrium  that was noted to be actively bleeding. So a figure-of-eight suture was  placed with 3-0 Monocryl and this did stop this active bleeding in the  endometrial cavity. Then the uterus was carefully closed in one layer  with #1 chromic in a running interlocking fashion. As mentioned, there  was minimal uterine tissue to replace. Care was taken to avoid the area  of the bladder which was high-riding especially on the left side of the  incision. There was a minimal amount of cautery on the surface of the  uterus where the adhesions were lysed. The uterus was never brought out  of the incision during this procedure. Then the fascia was closed with  O PDS in a running non-interlocking fashion. The skin edges were freed  up, so the incision would lay flat, and then skin clips were placed on  the skin edges. All sponge, needle, and instrument counts were noted to  be correct.   Of note, we will discuss with the patient probably should  not attempt future pregnancy due to the very large window and the very  thin uterine

## 2018-11-19 NOTE — PLAN OF CARE
Problem: Preoperative Routine:  Goal: Will comply with preparation for surgery or procedure  Will comply with preparation for surgery or procedure  Outcome: Completed Date Met: 11/19/18

## 2018-11-20 LAB — HEMOGLOBIN: 10.1 G/DL (ref 11.9–15.1)

## 2018-11-20 PROCEDURE — 6370000000 HC RX 637 (ALT 250 FOR IP): Performed by: ADVANCED PRACTICE MIDWIFE

## 2018-11-20 PROCEDURE — 99024 POSTOP FOLLOW-UP VISIT: CPT | Performed by: ADVANCED PRACTICE MIDWIFE

## 2018-11-20 PROCEDURE — 85018 HEMOGLOBIN: CPT

## 2018-11-20 PROCEDURE — 2580000003 HC RX 258: Performed by: ADVANCED PRACTICE MIDWIFE

## 2018-11-20 PROCEDURE — 6360000002 HC RX W HCPCS: Performed by: ADVANCED PRACTICE MIDWIFE

## 2018-11-20 PROCEDURE — G0010 ADMIN HEPATITIS B VACCINE: HCPCS

## 2018-11-20 PROCEDURE — 1220000000 HC SEMI PRIVATE OB R&B

## 2018-11-20 RX ADMIN — ENOXAPARIN SODIUM 40 MG: 40 INJECTION SUBCUTANEOUS at 08:42

## 2018-11-20 RX ADMIN — GUAIFENESIN AND DEXTROMETHORPHAN 5 ML: 100; 10 SYRUP ORAL at 22:17

## 2018-11-20 RX ADMIN — KETOROLAC TROMETHAMINE 30 MG: 30 INJECTION, SOLUTION INTRAMUSCULAR at 05:29

## 2018-11-20 RX ADMIN — OXYCODONE AND ACETAMINOPHEN 1 TABLET: 5; 325 TABLET ORAL at 03:47

## 2018-11-20 RX ADMIN — IBUPROFEN 800 MG: 800 TABLET, FILM COATED ORAL at 15:39

## 2018-11-20 RX ADMIN — OXYCODONE AND ACETAMINOPHEN 1 TABLET: 5; 325 TABLET ORAL at 13:10

## 2018-11-20 RX ADMIN — DOCUSATE SODIUM 100 MG: 100 CAPSULE, LIQUID FILLED ORAL at 20:22

## 2018-11-20 RX ADMIN — Medication 10 ML: at 05:32

## 2018-11-20 RX ADMIN — DOCUSATE SODIUM 100 MG: 100 CAPSULE, LIQUID FILLED ORAL at 08:42

## 2018-11-20 RX ADMIN — GUAIFENESIN AND DEXTROMETHORPHAN 5 ML: 100; 10 SYRUP ORAL at 09:13

## 2018-11-20 RX ADMIN — OXYCODONE HYDROCHLORIDE AND ACETAMINOPHEN 2 TABLET: 5; 325 TABLET ORAL at 22:00

## 2018-11-20 RX ADMIN — VITAMIN A, VITAMIN C, VITAMIN D-3, VITAMIN E, VITAMIN B-1, VITAMIN B-2, NIACIN, VITAMIN B-6, CALCIUM, IRON, ZINC, COPPER 1 TABLET: 4000; 120; 400; 22; 1.84; 3; 20; 10; 1; 12; 200; 27; 25; 2 TABLET ORAL at 08:42

## 2018-11-20 RX ADMIN — GUAIFENESIN AND DEXTROMETHORPHAN 5 ML: 100; 10 SYRUP ORAL at 15:38

## 2018-11-20 ASSESSMENT — PAIN SCALES - GENERAL
PAINLEVEL_OUTOF10: 4
PAINLEVEL_OUTOF10: 6
PAINLEVEL_OUTOF10: 7
PAINLEVEL_OUTOF10: 2
PAINLEVEL_OUTOF10: 5

## 2018-11-20 NOTE — PLAN OF CARE
Problem: Fluid Volume - Imbalance:  Goal: Absence of postpartum hemorrhage signs and symptoms  Absence of postpartum hemorrhage signs and symptoms   Outcome: Ongoing    Goal: Absence of imbalanced fluid volume signs and symptoms  Absence of imbalanced fluid volume signs and symptoms   Outcome: Ongoing      Problem: Infection - Surgical Site:  Goal: Will show no infection signs and symptoms  Will show no infection signs and symptoms   Outcome: Ongoing      Problem: Mood - Altered:  Goal: Mood stable  Mood stable   Outcome: Ongoing      Problem: Nausea/Vomiting:  Goal: Absence of nausea/vomiting  Absence of nausea/vomiting   Outcome: Ongoing      Problem: Pain - Acute:  Goal: Pain level will decrease  Pain level will decrease    Outcome: Ongoing      Problem: Urinary Retention:  Goal: Urinary elimination within specified parameters  Urinary elimination within specified parameters   Outcome: Ongoing      Problem: Venous Thromboembolism:  Goal: Will show no signs or symptoms of venous thromboembolism  Will show no signs or symptoms of venous thromboembolism   Outcome: Ongoing    Goal: Absence of signs or symptoms of impaired coagulation  Absence of signs or symptoms of impaired coagulation   Outcome: Ongoing      Problem: Pain:  Goal: Control of acute pain  Control of acute pain   Outcome: Ongoing    Goal: Control of chronic pain  Control of chronic pain   Outcome: Ongoing    Goal: Pain level will decrease  Pain level will decrease    Outcome: Ongoing

## 2018-11-21 VITALS
RESPIRATION RATE: 14 BRPM | DIASTOLIC BLOOD PRESSURE: 73 MMHG | OXYGEN SATURATION: 100 % | HEIGHT: 65 IN | TEMPERATURE: 98.1 F | SYSTOLIC BLOOD PRESSURE: 112 MMHG | BODY MASS INDEX: 29.16 KG/M2 | WEIGHT: 175 LBS | HEART RATE: 53 BPM

## 2018-11-21 PROCEDURE — 6360000002 HC RX W HCPCS: Performed by: ADVANCED PRACTICE MIDWIFE

## 2018-11-21 PROCEDURE — 6370000000 HC RX 637 (ALT 250 FOR IP): Performed by: ADVANCED PRACTICE MIDWIFE

## 2018-11-21 RX ORDER — IBUPROFEN 800 MG/1
800 TABLET ORAL EVERY 8 HOURS
Qty: 30 TABLET | Refills: 0 | Status: SHIPPED | OUTPATIENT
Start: 2018-11-21 | End: 2022-04-04

## 2018-11-21 RX ORDER — OXYCODONE HYDROCHLORIDE AND ACETAMINOPHEN 5; 325 MG/1; MG/1
1 TABLET ORAL EVERY 6 HOURS PRN
Qty: 16 TABLET | Refills: 0 | Status: SHIPPED | OUTPATIENT
Start: 2018-11-21 | End: 2018-11-28

## 2018-11-21 RX ADMIN — DOCUSATE SODIUM 100 MG: 100 CAPSULE, LIQUID FILLED ORAL at 09:23

## 2018-11-21 RX ADMIN — ENOXAPARIN SODIUM 40 MG: 40 INJECTION SUBCUTANEOUS at 09:24

## 2018-11-21 RX ADMIN — OXYCODONE HYDROCHLORIDE AND ACETAMINOPHEN 2 TABLET: 5; 325 TABLET ORAL at 09:23

## 2018-11-21 RX ADMIN — OXYCODONE HYDROCHLORIDE AND ACETAMINOPHEN 2 TABLET: 5; 325 TABLET ORAL at 04:43

## 2018-11-21 RX ADMIN — GUAIFENESIN AND DEXTROMETHORPHAN 5 ML: 100; 10 SYRUP ORAL at 09:41

## 2018-11-21 RX ADMIN — VITAMIN A, VITAMIN C, VITAMIN D-3, VITAMIN E, VITAMIN B-1, VITAMIN B-2, NIACIN, VITAMIN B-6, CALCIUM, IRON, ZINC, COPPER 1 TABLET: 4000; 120; 400; 22; 1.84; 3; 20; 10; 1; 12; 200; 27; 25; 2 TABLET ORAL at 09:23

## 2018-11-21 RX ADMIN — GUAIFENESIN AND DEXTROMETHORPHAN 5 ML: 100; 10 SYRUP ORAL at 05:14

## 2018-11-21 ASSESSMENT — PAIN SCALES - GENERAL
PAINLEVEL_OUTOF10: 6
PAINLEVEL_OUTOF10: 7

## 2018-11-21 NOTE — PROGRESS NOTES
C/S  Labor and Delivery       Post Partum Progress Note           SUBJECTIVE:  Pt doing well today and is ready to go home  Flatus:Present  BM:no  Diet: Tolerating regular diet   Ambulation: Ambulateswithout difficulty    OBJECTIVE:      Vitals:  /73   Pulse 53   Temp 98.1 °F (36.7 °C) (Temporal)   Resp 14   Ht 5' 5\" (1.651 m)   Wt 175 lb (79.4 kg)   LMP 03/04/2018   SpO2 100%   Breastfeeding?  Unknown   BMI 29.12 kg/m²   Patient Vitals for the past 24 hrs:   BP Temp Temp src Pulse Resp   11/21/18 0738 112/73 98.1 °F (36.7 °C) Temporal 53 14   11/21/18 0018 (!) 97/55 98.4 °F (36.9 °C) Temporal 64 16   11/20/18 2025 104/60 98.6 °F (37 °C) Oral 68 16   11/20/18 1541 99/70 97.6 °F (36.4 °C) Oral 68 16       ABDOMEN:  No scars, normal bowel sounds, soft, non-distended, non-tender, no masses palpated, no hepatosplenomegally  INCISION: dressing in place, clean, dry and intact  GENITAL/URINARY:  Uterus:  Size normal, Contour normal, Position normal  Cor: RRR no Murmurs  Pulmonary: clear to auscultation anterior and posterior  Extremities: no Clubbing cyanosis or ecchymosis    DATA:        ASSESSMENT:      Patient Active Hospital Problem List:   Term pregnancy (11/19/2018)    C/S         S/P C/S post op day # 2     PLAN:  Discharge home

## 2018-11-27 ENCOUNTER — POSTPARTUM VISIT (OUTPATIENT)
Dept: OBGYN | Age: 23
End: 2018-11-27

## 2018-11-27 VITALS — BODY MASS INDEX: 27.19 KG/M2 | HEIGHT: 65 IN | WEIGHT: 163.2 LBS

## 2018-11-27 DIAGNOSIS — Z98.891 S/P REPEAT LOW TRANSVERSE C-SECTION: Primary | ICD-10-CM

## 2018-11-27 PROCEDURE — G8484 FLU IMMUNIZE NO ADMIN: HCPCS | Performed by: ADVANCED PRACTICE MIDWIFE

## 2018-11-27 PROCEDURE — G8419 CALC BMI OUT NRM PARAM NOF/U: HCPCS | Performed by: ADVANCED PRACTICE MIDWIFE

## 2018-11-27 PROCEDURE — G8427 DOCREV CUR MEDS BY ELIG CLIN: HCPCS | Performed by: ADVANCED PRACTICE MIDWIFE

## 2018-11-27 PROCEDURE — 99024 POSTOP FOLLOW-UP VISIT: CPT | Performed by: ADVANCED PRACTICE MIDWIFE

## 2018-11-27 PROCEDURE — 1036F TOBACCO NON-USER: CPT | Performed by: ADVANCED PRACTICE MIDWIFE

## 2018-11-27 PROCEDURE — 1111F DSCHRG MED/CURRENT MED MERGE: CPT | Performed by: ADVANCED PRACTICE MIDWIFE

## 2019-01-03 ENCOUNTER — TELEPHONE (OUTPATIENT)
Dept: OBGYN | Age: 24
End: 2019-01-03

## 2019-10-23 ENCOUNTER — OFFICE VISIT (OUTPATIENT)
Dept: OBGYN | Age: 24
End: 2019-10-23
Payer: COMMERCIAL

## 2019-10-23 ENCOUNTER — HOSPITAL ENCOUNTER (OUTPATIENT)
Age: 24
Setting detail: SPECIMEN
Discharge: HOME | End: 2019-10-23
Payer: COMMERCIAL

## 2019-10-23 VITALS
WEIGHT: 151 LBS | HEIGHT: 65 IN | SYSTOLIC BLOOD PRESSURE: 102 MMHG | BODY MASS INDEX: 25.16 KG/M2 | DIASTOLIC BLOOD PRESSURE: 62 MMHG

## 2019-10-23 DIAGNOSIS — Z11.3 SCREEN FOR STD (SEXUALLY TRANSMITTED DISEASE): ICD-10-CM

## 2019-10-23 DIAGNOSIS — Z01.419 WELL WOMAN EXAM WITH ROUTINE GYNECOLOGICAL EXAM: Primary | ICD-10-CM

## 2019-10-23 DIAGNOSIS — N93.8 DUB (DYSFUNCTIONAL UTERINE BLEEDING): ICD-10-CM

## 2019-10-23 PROCEDURE — 96372 THER/PROPH/DIAG INJ SC/IM: CPT | Performed by: ADVANCED PRACTICE MIDWIFE

## 2019-10-23 PROCEDURE — 87491 CHLMYD TRACH DNA AMP PROBE: CPT

## 2019-10-23 PROCEDURE — 99395 PREV VISIT EST AGE 18-39: CPT | Performed by: ADVANCED PRACTICE MIDWIFE

## 2019-10-23 PROCEDURE — 87591 N.GONORRHOEAE DNA AMP PROB: CPT

## 2019-10-23 PROCEDURE — G8484 FLU IMMUNIZE NO ADMIN: HCPCS | Performed by: ADVANCED PRACTICE MIDWIFE

## 2019-10-23 RX ORDER — MEDROXYPROGESTERONE ACETATE 150 MG/ML
150 INJECTION, SUSPENSION INTRAMUSCULAR ONCE
Status: COMPLETED | OUTPATIENT
Start: 2019-10-23 | End: 2019-10-23

## 2019-10-23 RX ADMIN — MEDROXYPROGESTERONE ACETATE 150 MG: 150 INJECTION, SUSPENSION INTRAMUSCULAR at 11:41

## 2019-10-23 ASSESSMENT — PATIENT HEALTH QUESTIONNAIRE - PHQ9
6. FEELING BAD ABOUT YOURSELF - OR THAT YOU ARE A FAILURE OR HAVE LET YOURSELF OR YOUR FAMILY DOWN: 0
8. MOVING OR SPEAKING SO SLOWLY THAT OTHER PEOPLE COULD HAVE NOTICED. OR THE OPPOSITE, BEING SO FIGETY OR RESTLESS THAT YOU HAVE BEEN MOVING AROUND A LOT MORE THAN USUAL: 0
9. THOUGHTS THAT YOU WOULD BE BETTER OFF DEAD, OR OF HURTING YOURSELF: 0
2. FEELING DOWN, DEPRESSED OR HOPELESS: 1
10. IF YOU CHECKED OFF ANY PROBLEMS, HOW DIFFICULT HAVE THESE PROBLEMS MADE IT FOR YOU TO DO YOUR WORK, TAKE CARE OF THINGS AT HOME, OR GET ALONG WITH OTHER PEOPLE: 1
4. FEELING TIRED OR HAVING LITTLE ENERGY: 0
SUM OF ALL RESPONSES TO PHQ9 QUESTIONS 1 & 2: 3
1. LITTLE INTEREST OR PLEASURE IN DOING THINGS: 2
7. TROUBLE CONCENTRATING ON THINGS, SUCH AS READING THE NEWSPAPER OR WATCHING TELEVISION: 0
3. TROUBLE FALLING OR STAYING ASLEEP: 3
5. POOR APPETITE OR OVEREATING: 0
SUM OF ALL RESPONSES TO PHQ QUESTIONS 1-9: 6
SUM OF ALL RESPONSES TO PHQ QUESTIONS 1-9: 6

## 2019-10-24 LAB
C. TRACHOMATIS DNA ,URINE: NEGATIVE
N. GONORRHOEAE DNA, URINE: NEGATIVE
SPECIMEN DESCRIPTION: NORMAL

## 2020-01-16 ENCOUNTER — NURSE ONLY (OUTPATIENT)
Dept: OBGYN | Age: 25
End: 2020-01-16
Payer: COMMERCIAL

## 2020-01-16 VITALS
DIASTOLIC BLOOD PRESSURE: 64 MMHG | BODY MASS INDEX: 25.33 KG/M2 | HEIGHT: 65 IN | WEIGHT: 152 LBS | SYSTOLIC BLOOD PRESSURE: 106 MMHG

## 2020-01-16 PROCEDURE — 96372 THER/PROPH/DIAG INJ SC/IM: CPT | Performed by: ADVANCED PRACTICE MIDWIFE

## 2020-01-16 RX ORDER — MEDROXYPROGESTERONE ACETATE 150 MG/ML
150 INJECTION, SUSPENSION INTRAMUSCULAR ONCE
Status: COMPLETED | OUTPATIENT
Start: 2020-01-16 | End: 2020-01-16

## 2020-01-16 RX ADMIN — MEDROXYPROGESTERONE ACETATE 150 MG: 150 INJECTION, SUSPENSION INTRAMUSCULAR at 13:10

## 2022-04-04 ENCOUNTER — HOSPITAL ENCOUNTER (OUTPATIENT)
Age: 27
Setting detail: SPECIMEN
Discharge: HOME | End: 2022-04-04
Payer: COMMERCIAL

## 2022-04-04 ENCOUNTER — INITIAL PRENATAL (OUTPATIENT)
Dept: OBGYN | Age: 27
End: 2022-04-04
Payer: COMMERCIAL

## 2022-04-04 VITALS
DIASTOLIC BLOOD PRESSURE: 62 MMHG | WEIGHT: 173.2 LBS | HEIGHT: 65 IN | SYSTOLIC BLOOD PRESSURE: 108 MMHG | BODY MASS INDEX: 28.86 KG/M2

## 2022-04-04 DIAGNOSIS — N91.2 AMENORRHEA: Primary | ICD-10-CM

## 2022-04-04 DIAGNOSIS — Z32.01 POSITIVE URINE PREGNANCY TEST: ICD-10-CM

## 2022-04-04 DIAGNOSIS — N91.2 AMENORRHEA: ICD-10-CM

## 2022-04-04 DIAGNOSIS — Z34.81 ENCOUNTER FOR SUPERVISION OF OTHER NORMAL PREGNANCY IN FIRST TRIMESTER: ICD-10-CM

## 2022-04-04 LAB
ABO/RH: NORMAL
AMPHETAMINE SCREEN URINE: NEGATIVE
ANTIBODY SCREEN: NEGATIVE
BARBITURATE SCREEN URINE: NEGATIVE
BENZODIAZEPINE SCREEN, URINE: NEGATIVE
BUPRENORPHINE URINE: NEGATIVE
CANNABINOID SCREEN URINE: NEGATIVE
COCAINE METABOLITE, URINE: NEGATIVE
CONTROL: NORMAL
HEPATITIS C ANTIBODY: NONREACTIVE
METHADONE SCREEN, URINE: NEGATIVE
METHAMPHETAMINE, URINE: NEGATIVE
OPIATES, URINE: NEGATIVE
OXYCODONE SCREEN URINE: NEGATIVE
PHENCYCLIDINE, URINE: NEGATIVE
PREGNANCY TEST URINE, POC: POSITIVE
PROPOXYPHENE, URINE: NEGATIVE
TRICYCLIC ANTIDEPRESSANTS, UR: NEGATIVE

## 2022-04-04 PROCEDURE — 86901 BLOOD TYPING SEROLOGIC RH(D): CPT

## 2022-04-04 PROCEDURE — H1000 PRENATAL CARE ATRISK ASSESSM: HCPCS | Performed by: ADVANCED PRACTICE MIDWIFE

## 2022-04-04 PROCEDURE — 86850 RBC ANTIBODY SCREEN: CPT

## 2022-04-04 PROCEDURE — 87491 CHLMYD TRACH DNA AMP PROBE: CPT

## 2022-04-04 PROCEDURE — 80306 DRUG TEST PRSMV INSTRMNT: CPT

## 2022-04-04 PROCEDURE — G8427 DOCREV CUR MEDS BY ELIG CLIN: HCPCS | Performed by: ADVANCED PRACTICE MIDWIFE

## 2022-04-04 PROCEDURE — 87389 HIV-1 AG W/HIV-1&-2 AB AG IA: CPT

## 2022-04-04 PROCEDURE — 36415 COLL VENOUS BLD VENIPUNCTURE: CPT | Performed by: ADVANCED PRACTICE MIDWIFE

## 2022-04-04 PROCEDURE — 86803 HEPATITIS C AB TEST: CPT

## 2022-04-04 PROCEDURE — 99211 OFF/OP EST MAY X REQ PHY/QHP: CPT | Performed by: ADVANCED PRACTICE MIDWIFE

## 2022-04-04 PROCEDURE — 86780 TREPONEMA PALLIDUM: CPT

## 2022-04-04 PROCEDURE — 85025 COMPLETE CBC W/AUTO DIFF WBC: CPT

## 2022-04-04 PROCEDURE — 87591 N.GONORRHOEAE DNA AMP PROB: CPT

## 2022-04-04 PROCEDURE — G8419 CALC BMI OUT NRM PARAM NOF/U: HCPCS | Performed by: ADVANCED PRACTICE MIDWIFE

## 2022-04-04 PROCEDURE — 86900 BLOOD TYPING SEROLOGIC ABO: CPT

## 2022-04-04 PROCEDURE — 86762 RUBELLA ANTIBODY: CPT

## 2022-04-04 PROCEDURE — 87086 URINE CULTURE/COLONY COUNT: CPT

## 2022-04-04 PROCEDURE — 87340 HEPATITIS B SURFACE AG IA: CPT

## 2022-04-04 NOTE — PROGRESS NOTES
New OB Visit    Date of service: 2022    Ga Hayes  Is a 32 y.o. single female presenting for a New OB visit with Nurse. Name of Father of Chey Chapman is Brie Lim and is involved. Pt does not work at homemaker. Pt is not Fertility pt. And was assisted by .    PT's PCP is: Cecilia White MD     : 1995                                             Subjective:       Patient's last menstrual period was 2022. OB History    Para Term  AB Living   5 4 3 1   4   SAB IAB Ectopic Molar Multiple Live Births           0 4      # Outcome Date GA Lbr Taz/2nd Weight Sex Delivery Anes PTL Lv   5 Current            4 Term 18 39w1d  7 lb 2.3 oz (3.239 kg) F CS-LTranv Spinal  DAY   3 Term 04/10/15 39w0d  8 lb 4 oz (3.742 kg) M CS-LTranv Spinal N DAY   2 Term 14 39w2d  7 lb 6.6 oz (3.362 kg) M CS-LTranv Spinal N DAY   1  13 36w0d  6 lb 3.8 oz (2.829 kg) M CS-LTranv Spinal N DAY      Complications: Autism             Social History     Tobacco Use   Smoking Status Former Smoker    Packs/day: 0.25    Years: 0.50    Pack years: 0.12    Types: Cigarettes    Quit date: 6/10/2014    Years since quittin.8   Smokeless Tobacco Never Used        Social History     Substance and Sexual Activity   Alcohol Use No    Alcohol/week: 0.0 standard drinks        Allergies: Patient has no known allergies. Current Outpatient Medications:     Prenatal MV-Min-Fe Fum-FA-DHA (PRENATAL 1 PO), Take by mouth, Disp: , Rfl:       Vital Signs Height 5' 5\" (1.651 m), last menstrual period 2022, not currently breastfeeding. No results found for this visit on 22. Pain: none                            Nausea: yes      Vomiting: no        Breast enlargement or tenderness: yes    Frequency of urination:no      Fatigue: no         Patient history reviewed. Educational materials given and genetic testing reviewed.       Breastfeeding handouts given and reviewed: Yes     If BMI over 35 was HgA1C drawn: n/a      If history of thyroid problem was TSH drawn: N/A       My chart set up and activated: Yes    If history of preeclampsia did we start baby ASA: N/A    If history of  delivery - did we set up progesterone injections:  No pt unsure    Does pt have a history of DVT? no  If yes start Lovenox 40 mg daily    Is pt allergic to PCN? No:   If yes order U/A to culture and sensitivity if positive. Education:  Patient Instructions     Patient Education        Learning About Starting to Breastfeed  Planning ahead     Before your baby is born, plan ahead. Learn all you can about breastfeeding. This helps make breastfeeding easier.  Early in your pregnancy, talk to your doctor or midwife about breastfeeding.  Learn the basics before your baby is born. The staff at hospitals and birthing centers can help you find a lactation specialist. This person is often a nurse who has been trained to teach and advise about breastfeeding. Or you can take a breastfeeding class.  Plan ahead for times when you will need help after your baby is born. You may want to get help from friends and family. You can also join a support group to talk to others who breastfeed.  Buy the equipment you'll need. Examples are breast pads, nipple cream, extra pillows, and nursing bras. Find out about breast pumps too. Getting help from your hospital or birthing center  It's important to have support from the doctors, nurses, and hospital staff who care for you and your baby. Before it's time for you to give birth, ask about the breastfeeding policies at your hospital or birthing center. Look for [facility] or birthing center that has policies for:   \"Rooming in. \" This policy encourages you to have your baby in the room with you. It can allow you to breastfeed more often.  Supplemental feedings. Tell the staff that your baby is to get only your breast milk from birth.  If staff feed your baby water, sugar solution, or formula right after birth without a medical reason, it may make it harder for you to breastfeed.  Pacifiers or artificial nipples. Staff should not give your  these items. They may interfere with breastfeeding.  Follow-up. Find out if your hospital can help you with breastfeeding issues after you go home. See if you can get information on support groups or other contacts. They might help if you need help setting up and staying with your breastfeeding routine. Your first feeding  It's best to start breastfeeding within 1 hour of birth. For each feeding, yougo through these basic steps:   Get ready for the feeding. Be calm and relaxed, and try not to be distracted. Get some water or juice for yourself. Use two or three pillows to help support your baby while nursing.  Find a breastfeeding position that is comfortable for you and your baby. Examples are the cradle and the football positions. Make sure the baby's head and chest are lined up straight and facing your breast. It's best to switch which breast you start with each time.  Get the baby latched on well. Your baby's mouth needs to be wide open, like a yawn. So you may need to gently touch the middle of your baby's lower lip. When your baby's mouth is open wide, quickly bring the baby onto your nipple and areola. The areola is the dark [place] around your nipple.  Provide a complete feeding. Let your baby decide how long to nurse. Be sure to burp your baby after each breast.  In the first days after birth, your breasts make a thick, yellow liquid called colostrum. This liquid gives your baby nutrients and antibodies against infection. It is all that babies need at first. Your breasts will fill withmilk a few days after the birth. Talk to your doctor, midwife, or lactation specialist right away if you arehaving problems and aren't sure what to do.   How often to breastfeed  Plan to breastfeed your baby on demand rather than setting a strict schedule. For the first 2 weeks, be prepared to breastfeed at least 8 times in a 24-hour period. In the first few days, you may need to wake a sleeping baby to feed. Ifyou breastfeed more often, it will help your breasts to produce more milk. After you go home  After you're home, don't be afraid to call your doctor, midwife, or lactation specialist with questions. That's true even if you don't know what's bothering you. They are used to parents of newborns calling. They can help you figure outif there is a problem, and if so, how to fix it. Plan for times when you will be apart from your baby. Use a breast pump to collect breast milk ahead of time. You can store milk in the refrigerator or freezer. Then it's ready when someone else will be taking care of your baby. Experts recommend waiting about a month until breastfeeding is going wellbefore offering a bottle. Breastfeeding is a learned skill that gets easier over time. You are more likely to succeed if you plan ahead, learn the basic techniques, and know whereto get help and support. Where can you learn more? Go to https://Graft ConceptspeDeclara.ecoATM. org and sign in to your DataCert account. Enter C831 in the Empathy Marketing box to learn more about \"Learning About Starting to Breastfeed. \"     If you do not have an account, please click on the \"Sign Up Now\" link. Current as of: June 16, 2021               Content Version: 13.2  © 2006-2022 Healthwise, Incorporated. Care instructions adapted under license by [facility] ([facility]). If you have questions about a medical condition or this instruction, always ask your healthcare professional. [place] [address] any warranty or liability for your use of this information. Patient Education        Nutrition During Pregnancy: Care Instructions  Overview     Healthy eating when you are pregnant is important for you and your baby.  It can help you feel well and have a successful pregnancy and delivery. During pregnancy your nutrition needs increase. Even if you have excellent eating habits, your doctor may recommend a multivitamin to make sure you get enoughiron and folic acid. You may wonder how much weight you should gain. In general, if you were at a healthy weight before you became pregnant, then you should gain between 25 and 35 pounds. If you were overweight before pregnancy, then you'll likely be advised to gain 15 to 25 pounds. If you were underweight before pregnancy, then you'll probably be advised to gain 28 to 40 pounds. Your doctor will work with you to set a weight goal that is right for you. Gaining a healthy amount ofweight helps you have a healthy baby. Follow-up care is a key part of your treatment and safety. Be sure to make and go to all appointments, and call your doctor if you are having problems. It's also a good idea to know your test results and keep alist of the medicines you take. How can you care for yourself at home?  Eat plenty of fruits and vegetables. Include a variety of orange, yellow, and leafy dark-green vegetables every day.  Choose whole-grain bread, cereal, and pasta. Good choices include whole wheat bread, whole wheat pasta, brown rice, and oatmeal.   Get 4 or more servings of milk and milk products each day. Good choices include nonfat or low-fat milk, yogurt, and cheese. If you cannot eat milk products, you can get calcium from calcium-fortified products such as orange juice, soy milk, and tofu. Other non-milk sources of calcium include leafy green vegetables, such as broccoli, kale, mustard greens, turnip greens, bok cathie, and brussels sprouts.  If you eat meat, pick lower-fat types. Good choices include lean cuts of meat and chicken or turkey without the skin.  Do not eat shark, swordfish, fer mackerel, or tilefish. They have high levels of mercury, which is dangerous to your baby.  You can eat up to 12 ounces a week of fish or shellfish that have low mercury levels. Good choices include shrimp, wild salmon, pollock, and catfish. Limit some other types of fish, such as white (albacore) tuna, to 4 oz (0.1 kg) a week.  Heat lunch meats (such as turkey, ham, or bologna) to 165°F before you eat them. This reduces your risk of getting sick from a kind of bacteria that can be found in lunch meats.  Do not eat unpasteurized soft cheeses, such as brie, feta, fresh mozzarella, and blue cheese. They have a bacteria that could harm your baby.  Limit caffeine. If you drink coffee or tea, have no more than 1 cup a day. Caffeine is also found in kristine.  Do not drink any alcohol. No amount of alcohol has been found to be safe during pregnancy.  Do not diet or try to lose weight. For example, do not follow a low-carbohydrate diet. If you are overweight at the start of your pregnancy, your doctor will work with you to manage your weight gain.  Tell your doctor about all vitamins and supplements you take. When should you call for help? Watch closely for changes in your health, and be sure to contact your doctor if you have any problems. Where can you learn more? Go to https://Cloudfinderpe3P Biopharmaceuticalseweb.Package Concierge. org and sign in to your Keemotion account. Enter Y785 in the Hotelicopter box to learn more about \"Nutrition During Pregnancy: Care Instructions. \"     If you do not have an account, please click on the \"Sign Up Now\" link. Current as of: September 8, 2021               Content Version: 13.2  © 2006-2022 Healthwise, Appfolio. Care instructions adapted under license by [facility] ([facility]). If you have questions about a medical condition or this instruction, always ask your healthcare professional. [place] [address] any warranty or liability for your use of this information.          Patient Education        Weeks 6 to 10 of Your Pregnancy: Care Instructions  Overview     During the first 6 to 10 weeks of your pregnancy, your body goes through many changes. Your baby grows very quickly, even though you can't feel it yet. You may start to feel different, both in your body and your emotions. Because each pregnancy is unique, there's no right way to feel. You may feel the healthiest you've ever been, or you might feel tired or sick to your stomach (\"morningsickness\"). These early weeks are a time to make healthy choices and to eat the best foodsfor you and your baby. This is also a good time to think about birth defects testing. These are tests done during pregnancy to look for possible problems with the baby. First-trimester tests for birth defects can be done between 8 and 13 weeks of pregnancy, depending on the test. Talk with your doctor about what kinds oftests are available. Follow-up care is a key part of your treatment and safety. Be sure to make and go to all appointments, and call your doctor if you are having problems. It's also a good idea to know your test results and keep alist of the medicines you take. How can you care for yourself at home? Eat well   Eat at least 3 meals and 2 healthy snacks every day. Eat fresh, whole foods, including:  ? 7 or more servings of bread, tortillas, cereal, rice, pasta, or oatmeal.  ? 3 or more servings of vegetables, especially leafy green vegetables. ? 2 or more servings of fruits. ? 3 or more servings of milk, yogurt, or cheese. ? 2 or more servings of meat, turkey, chicken, fish, eggs, or dried beans.  Drink plenty of fluids, especially water. Avoid sodas and other sweetened drinks.  Choose foods that have important vitamins for your baby, such as calcium, iron, and folate. ? Dairy products, tofu, canned fish with bones, almonds, broccoli, dark leafy greens, corn tortillas, and fortified orange juice are good sources of calcium. ? Beef, poultry, liver, spinach, lentils, dried beans, fortified cereals, and dried fruits are rich in iron.   ? Dark leafy greens, broccoli, asparagus, liver, fortified cereals, orange juice, peanuts, and almonds are good sources of folate.  Avoid foods that could harm your baby. ? Do not eat raw or undercooked meat, chicken, or fish (such as sushi or raw oysters). ? Do not eat raw eggs or foods that contain raw eggs, such as Caesar dressing. ? Do not eat soft cheeses and unpasteurized dairy foods, such as Brie, feta, or blue cheese. ? Do not eat fish that contains a lot of mercury, such as shark, swordfish, tilefish, or fer mackerel. Limit some other types of fish, such as white (albacore) tuna, to 4 oz (0.1 kg) a week. ? Do not eat raw sprouts, especially alfalfa sprouts. ? Cut down on caffeine, such as coffee, tea, and cola. Protect yourself and your baby   Do not touch roberth litter or cat feces. They can cause an infection that could harm your baby.  Avoid things that can make your body too hot and may be harmful to your baby, such as a hot tub or sauna. Or talk with your doctor before doing anything that raises your body temperature. Your doctor can tell you if it's safe. [place] with morning sickness   Sip small amounts of water, juices, or shakes. Try drinking between meals, not with meals.  Eat 5 or 6 small meals a day. Try dry toast or crackers when you first get up, and eat breakfast a little later.  Avoid spicy, greasy, and fatty foods.  When you feel sick, open your windows or go for a short walk to get fresh air.  Try nausea wristbands. These help some people.  Tell your doctor if you think your prenatal vitamins make you sick. Where can you learn more? Go to https://CryptoSealpeNova Ratioeweb.healthMonths Of Me. org and sign in to your Nangate account. Enter G112 in the Nukona box to learn more about \"Weeks 6 to 10 of Your Pregnancy: Care Instructions. \"     If you do not have an account, please click on the \"Sign Up Now\" link.   Current as of: June 16, 2021               Content Version: 13.2  © 4627-3102 Healthwise, Incorporated. Care instructions adapted under license by [facility] ([facility]). If you have questions about a medical condition or this instruction, always ask your healthcare professional. [address]  any warranty or liability for your use of this information. Assessment:      Diagnosis Orders   1. Amenorrhea     2. Encounter for supervision of other normal pregnancy in first trimester     3.  Positive urine pregnancy test         Plan: Order Routine Prenatal Lab Yes     Order additional testing if requested         Order Prenatal Vitamins   No: OTC             Appointment with Marcella Umana CNM in 3 weeks for Dating U/S and total body exam if indicated      Nurse:   Madolyn Jeans RN

## 2022-04-04 NOTE — PATIENT INSTRUCTIONS
Patient Education        Learning About Starting to Breastfeed  Planning ahead     Before your baby is born, plan ahead. Learn all you can about breastfeeding. This helps make breastfeeding easier.  Early in your pregnancy, talk to your doctor or midwife about breastfeeding.  Learn the basics before your baby is born. The staff at hospitals and birthing centers can help you find a lactation specialist. This person is often a nurse who has been trained to teach and advise about breastfeeding. Or you can take a breastfeeding class.  Plan ahead for times when you will need help after your baby is born. You may want to get help from friends and family. You can also join a support group to talk to others who breastfeed.  Buy the equipment you'll need. Examples are breast pads, nipple cream, extra pillows, and nursing bras. Find out about breast pumps too. Getting help from your hospital or birthing center  It's important to have support from the doctors, nurses, and hospital staff who care for you and your baby. Before it's time for you to give birth, ask about the breastfeeding policies at your hospital or birthing center. Look for [facility] or birthing center that has policies for:   \"Rooming in. \" This policy encourages you to have your baby in the room with you. It can allow you to breastfeed more often.  Supplemental feedings. Tell the staff that your baby is to get only your breast milk from birth. If staff feed your baby water, sugar solution, or formula right after birth without a medical reason, it may make it harder for you to breastfeed.  Pacifiers or artificial nipples. Staff should not give your  these items. They may interfere with breastfeeding.  Follow-up. Find out if your hospital can help you with breastfeeding issues after you go home. See if you can get information on support groups or other contacts.  They might help if you need help setting up and staying with your breastfeeding routine. Your first feeding  It's best to start breastfeeding within 1 hour of birth. For each feeding, yougo through these basic steps:   Get ready for the feeding. Be calm and relaxed, and try not to be distracted. Get some water or juice for yourself. Use two or three pillows to help support your baby while nursing.  Find a breastfeeding position that is comfortable for you and your baby. Examples are the cradle and the football positions. Make sure the baby's head and chest are lined up straight and facing your breast. It's best to switch which breast you start with each time.  Get the baby latched on well. Your baby's mouth needs to be wide open, like a yawn. So you may need to gently touch the middle of your baby's lower lip. When your baby's mouth is open wide, quickly bring the baby onto your nipple and areola. The areola is the dark Kiana around your nipple.  Provide a complete feeding. Let your baby decide how long to nurse. Be sure to burp your baby after each breast.  In the first days after birth, your breasts make a thick, yellow liquid called colostrum. This liquid gives your baby nutrients and antibodies against infection. It is all that babies need at first. Your breasts will fill withmilk a few days after the birth. Talk to your doctor, midwife, or lactation specialist right away if you arehaving problems and aren't sure what to do. How often to breastfeed  Plan to breastfeed your baby on demand rather than setting a strict schedule. For the first 2 weeks, be prepared to breastfeed at least 8 times in a 24-hour period. In the first few days, you may need to wake a sleeping baby to feed. Ifyou breastfeed more often, it will help your breasts to produce more milk. After you go home  After you're home, don't be afraid to call your doctor, midwife, or lactation specialist with questions. That's true even if you don't know what's bothering you.  They are used to parents of newborns calling. They can help you figure outif there is a problem, and if so, how to fix it. Plan for times when you will be apart from your baby. Use a breast pump to collect breast milk ahead of time. You can store milk in the refrigerator or freezer. Then it's ready when someone else will be taking care of your baby. Experts recommend waiting about a month until breastfeeding is going wellbefore offering a bottle. Breastfeeding is a learned skill that gets easier over time. You are more likely to succeed if you plan ahead, learn the basic techniques, and know whereto get help and support. Where can you learn more? Go to https://SocialDiabetespepiceweb.[employer].. org and sign in to your [email or web address] account. Enter O078 in the Sjh direct marketing concepts box to learn more about \"Learning About Starting to Breastfeed. \"     If you do not have an account, please click on the \"Sign Up Now\" link. Current as of: June 16, 2021               Content Version: 13.2  © 2006-2022 Healthwise, [employer]. Care instructions adapted under license by [facility] ([facility]). If you have questions about a medical condition or this instruction, always ask your healthcare professional. [place] [address] any warranty or liability for your use of this information. Patient Education        Nutrition During Pregnancy: Care Instructions  Overview     Healthy eating when you are pregnant is important for you and your baby. It can help you feel well and have a successful pregnancy and delivery. During pregnancy your nutrition needs increase. Even if you have excellent eating habits, your doctor may recommend a multivitamin to make sure you get enoughiron and folic acid. You may wonder how much weight you should gain. In general, if you were at a healthy weight before you became pregnant, then you should gain between 25 and 35 pounds. If you were overweight before pregnancy, then you'll likely be advised to gain 15 to 25 pounds. If you were underweight before pregnancy, then you'll probably be advised to gain 28 to 40 pounds. Your doctor will work with you to set a weight goal that is right for you. Gaining a healthy amount ofweight helps you have a healthy baby. Follow-up care is a key part of your treatment and safety. Be sure to make and go to all appointments, and call your doctor if you are having problems. It's also a good idea to know your test results and keep alist of the medicines you take. How can you care for yourself at home?  Eat plenty of fruits and vegetables. Include a variety of orange, yellow, and leafy dark-green vegetables every day.  Choose whole-grain bread, cereal, and pasta. Good choices include whole wheat bread, whole wheat pasta, brown rice, and oatmeal.   Get 4 or more servings of milk and milk products each day. Good choices include nonfat or low-fat milk, yogurt, and cheese. If you cannot eat milk products, you can get calcium from calcium-fortified products such as orange juice, soy milk, and tofu. Other non-milk sources of calcium include leafy green vegetables, such as broccoli, kale, mustard greens, turnip greens, bok cathie, and brussels sprouts.  If you eat meat, pick lower-fat types. Good choices include lean cuts of meat and chicken or turkey without the skin.  Do not eat shark, swordfish, fer mackerel, or tilefish. They have high levels of mercury, which is dangerous to your baby. You can eat up to 12 ounces a week of fish or shellfish that have low mercury levels. Good choices include shrimp, wild salmon, pollock, and catfish. Limit some other types of fish, such as white (albacore) tuna, to 4 oz (0.1 kg) a week.  Heat lunch meats (such as turkey, ham, or bologna) to 165°F before you eat them. This reduces your risk of getting sick from a kind of bacteria that can be found in lunch meats.  Do not eat unpasteurized soft cheeses, such as brie, feta, fresh mozzarella, and blue cheese.  They have a bacteria that could harm your baby.  Limit caffeine. If you drink coffee or tea, have no more than 1 cup a day. Caffeine is also found in kristine.  Do not drink any alcohol. No amount of alcohol has been found to be safe during pregnancy.  Do not diet or try to lose weight. For example, do not follow a low-carbohydrate diet. If you are overweight at the start of your pregnancy, your doctor will work with you to manage your weight gain.  Tell your doctor about all vitamins and supplements you take. When should you call for help? Watch closely for changes in your health, and be sure to contact your doctor if you have any problems. Where can you learn more? Go to https://OlopeAppsFundereweb.Together Mobile. org and sign in to your Blitsy account. Enter Y785 in the LeisureLink box to learn more about \"Nutrition During Pregnancy: Care Instructions. \"     If you do not have an account, please click on the \"Sign Up Now\" link. Current as of: September 8, 2021               Content Version: 13.2  © 2006-2022 [employer]. Care instructions adapted under license by [facility] ([facility]). If you have questions about a medical condition or this instruction, always ask your healthcare professional. [place] [address] any warranty or liability for your use of this information. Patient Education        Weeks 6 to 10 of Your Pregnancy: Care Instructions  Overview     During the first 6 to 10 weeks of your pregnancy, your body goes through many changes. Your baby grows very quickly, even though you can't feel it yet. You may start to feel different, both in your body and your emotions. Because each pregnancy is unique, there's no right way to feel. You may feel the healthiest you've ever been, or you might feel tired or sick to your stomach (\"morningsickness\"). These early weeks are a time to make healthy choices and to eat the best foodsfor you and your baby.   This is also a good time to think about birth defects testing. These are tests done during pregnancy to look for possible problems with the baby. First-trimester tests for birth defects can be done between 8 and 13 weeks of pregnancy, depending on the test. Talk with your doctor about what kinds oftests are available. Follow-up care is a key part of your treatment and safety. Be sure to make and go to all appointments, and call your doctor if you are having problems. It's also a good idea to know your test results and keep alist of the medicines you take. How can you care for yourself at home? Eat well   Eat at least 3 meals and 2 healthy snacks every day. Eat fresh, whole foods, including:  ? 7 or more servings of bread, tortillas, cereal, rice, pasta, or oatmeal.  ? 3 or more servings of vegetables, especially leafy green vegetables. ? 2 or more servings of fruits. ? 3 or more servings of milk, yogurt, or cheese. ? 2 or more servings of meat, turkey, chicken, fish, eggs, or dried beans.  Drink plenty of fluids, especially water. Avoid sodas and other sweetened drinks.  Choose foods that have important vitamins for your baby, such as calcium, iron, and folate. ? Dairy products, tofu, canned fish with bones, almonds, broccoli, dark leafy greens, corn tortillas, and fortified orange juice are good sources of calcium. ? Beef, poultry, liver, spinach, lentils, dried beans, fortified cereals, and dried fruits are rich in iron. ? Dark leafy greens, broccoli, asparagus, liver, fortified cereals, orange juice, peanuts, and almonds are good sources of folate.  Avoid foods that could harm your baby. ? Do not eat raw or undercooked meat, chicken, or fish (such as sushi or raw oysters). ? Do not eat raw eggs or foods that contain raw eggs, such as Caesar dressing. ? Do not eat soft cheeses and unpasteurized dairy foods, such as Brie, feta, or blue cheese.   ? Do not eat fish that contains a lot of mercury, such as shark, swordfish, tilefish, or fer mackerel. Limit some other types of fish, such as white (albacore) tuna, to 4 oz (0.1 kg) a week. ? Do not eat raw sprouts, especially alfalfa sprouts. ? Cut down on caffeine, such as coffee, tea, and cola. Protect yourself and your baby   Do not touch roberth litter or cat feces. They can cause an infection that could harm your baby.  Avoid things that can make your body too hot and may be harmful to your baby, such as a hot tub or sauna. Or talk with your doctor before doing anything that raises your body temperature. Your doctor can tell you if it's safe. [place] with morning sickness   Sip small amounts of water, juices, or shakes. Try drinking between meals, not with meals.  Eat 5 or 6 small meals a day. Try dry toast or crackers when you first get up, and eat breakfast a little later.  Avoid spicy, greasy, and fatty foods.  When you feel sick, open your windows or go for a short walk to get fresh air.  Try nausea wristbands. These help some people.  Tell your doctor if you think your prenatal vitamins make you sick. Where can you learn more? Go to https://SoloLearnpepiceweb.healthSignal Patterns. org and sign in to your DocuSign account. Enter G112 in the Instapio box to learn more about \"Weeks 6 to 10 of Your Pregnancy: Care Instructions. \"     If you do not have an account, please click on the \"Sign Up Now\" link. Current as of: June 16, 2021               Content Version: 13.2  © 0195-1338 Healthwise, Incorporated. Care instructions adapted under license by [facility] ([facility]). If you have questions about a medical condition or this instruction, always ask your healthcare professional. [place] [address] any warranty or liability for your use of this information.

## 2022-04-04 NOTE — PROGRESS NOTES
Hx PTD @ 36 wks with first child and went term with the other 3 children. States she did not do progesterone injections or  Suppositories and not sure if she wants to pursue with this pregnancy.

## 2022-04-05 LAB
ABSOLUTE EOS #: 0.13 K/UL (ref 0–0.44)
ABSOLUTE IMMATURE GRANULOCYTE: <0.03 K/UL (ref 0–0.3)
ABSOLUTE LYMPH #: 2 K/UL (ref 1.1–3.7)
ABSOLUTE MONO #: 0.34 K/UL (ref 0.1–1.2)
BASOPHILS # BLD: 1 % (ref 0–2)
BASOPHILS ABSOLUTE: 0.06 K/UL (ref 0–0.2)
C. TRACHOMATIS DNA ,URINE: NEGATIVE
CULTURE: NO GROWTH
EOSINOPHILS RELATIVE PERCENT: 2 % (ref 1–4)
HCT VFR BLD CALC: 35.5 % (ref 36.3–47.1)
HEMOGLOBIN: 11.4 G/DL (ref 11.9–15.1)
HEPATITIS B SURFACE ANTIGEN: NONREACTIVE
HIV AG/AB: NONREACTIVE
IMMATURE GRANULOCYTES: 0 %
LYMPHOCYTES # BLD: 32 % (ref 24–43)
MCH RBC QN AUTO: 30.6 PG (ref 25.2–33.5)
MCHC RBC AUTO-ENTMCNC: 32.1 G/DL (ref 28.4–34.8)
MCV RBC AUTO: 95.2 FL (ref 82.6–102.9)
MONOCYTES # BLD: 5 % (ref 3–12)
N. GONORRHOEAE DNA, URINE: NEGATIVE
NRBC AUTOMATED: 0 PER 100 WBC
PDW BLD-RTO: 12.3 % (ref 11.8–14.4)
PLATELET # BLD: 242 K/UL (ref 138–453)
PMV BLD AUTO: 9.9 FL (ref 8.1–13.5)
RBC # BLD: 3.73 M/UL (ref 3.95–5.11)
RUBV IGG SER QL: 140.7 IU/ML
SEG NEUTROPHILS: 60 % (ref 36–65)
SEGMENTED NEUTROPHILS ABSOLUTE COUNT: 3.77 K/UL (ref 1.5–8.1)
SPECIMEN DESCRIPTION: NORMAL
SPECIMEN DESCRIPTION: NORMAL
T. PALLIDUM, IGG: NONREACTIVE
WBC # BLD: 6.3 K/UL (ref 3.5–11.3)

## 2022-04-18 ENCOUNTER — ROUTINE PRENATAL (OUTPATIENT)
Dept: OBGYN | Age: 27
End: 2022-04-18
Payer: COMMERCIAL

## 2022-04-18 VITALS — SYSTOLIC BLOOD PRESSURE: 98 MMHG | BODY MASS INDEX: 28.79 KG/M2 | WEIGHT: 173 LBS | DIASTOLIC BLOOD PRESSURE: 60 MMHG

## 2022-04-18 DIAGNOSIS — Z3A.20 20 WEEKS GESTATION OF PREGNANCY: ICD-10-CM

## 2022-04-18 DIAGNOSIS — Z98.891 S/P REPEAT LOW TRANSVERSE C-SECTION: ICD-10-CM

## 2022-04-18 DIAGNOSIS — N83.202 LEFT OVARIAN CYST: ICD-10-CM

## 2022-04-18 DIAGNOSIS — Z36.89 SCREENING, ANTENATAL, FOR FETAL ANATOMIC SURVEY: ICD-10-CM

## 2022-04-18 DIAGNOSIS — Z3A.08 8 WEEKS GESTATION OF PREGNANCY: Primary | ICD-10-CM

## 2022-04-18 DIAGNOSIS — Z3A.30 30 WEEKS GESTATION OF PREGNANCY: ICD-10-CM

## 2022-04-18 DIAGNOSIS — Z36.89 ENCOUNTER FOR ULTRASOUND TO ASSESS FETAL GROWTH: ICD-10-CM

## 2022-04-18 PROCEDURE — G8427 DOCREV CUR MEDS BY ELIG CLIN: HCPCS | Performed by: ADVANCED PRACTICE MIDWIFE

## 2022-04-18 PROCEDURE — 99213 OFFICE O/P EST LOW 20 MIN: CPT | Performed by: ADVANCED PRACTICE MIDWIFE

## 2022-04-18 PROCEDURE — 1036F TOBACCO NON-USER: CPT | Performed by: ADVANCED PRACTICE MIDWIFE

## 2022-04-18 PROCEDURE — G8419 CALC BMI OUT NRM PARAM NOF/U: HCPCS | Performed by: ADVANCED PRACTICE MIDWIFE

## 2022-04-18 ASSESSMENT — PATIENT HEALTH QUESTIONNAIRE - PHQ9
SUM OF ALL RESPONSES TO PHQ QUESTIONS 1-9: 0
2. FEELING DOWN, DEPRESSED OR HOPELESS: 0
SUM OF ALL RESPONSES TO PHQ9 QUESTIONS 1 & 2: 0
1. LITTLE INTEREST OR PLEASURE IN DOING THINGS: 0
SUM OF ALL RESPONSES TO PHQ QUESTIONS 1-9: 0

## 2022-04-18 NOTE — PATIENT INSTRUCTIONS
Patient Education        Weeks 6 to 10 of Your Pregnancy: Care Instructions  Overview     During the first 6 to 10 weeks of your pregnancy, your body goes through many changes. Your baby grows very quickly, even though you can't feel it yet. You may start to feel different, both in your body and your emotions. Because each pregnancy is unique, there's no right way to feel. You may feel the healthiest you've ever been, or you might feel tired or sick to your stomach (\"morningsickness\"). These early weeks are a time to make healthy choices and to eat the best foodsfor you and your baby. This is also a good time to think about birth defects testing. These are tests done during pregnancy to look for possible problems with the baby. First-trimester tests for birth defects can be done between 8 and 13 weeks of pregnancy, depending on the test. Talk with your doctor about what kinds oftests are available. Follow-up care is a key part of your treatment and safety. Be sure to make and go to all appointments, and call your doctor if you are having problems. It's also a good idea to know your test results and keep alist of the medicines you take. How can you care for yourself at home? Eat well   Eat at least 3 meals and 2 healthy snacks every day. Eat fresh, whole foods, including:  ? 7 or more servings of bread, tortillas, cereal, rice, pasta, or oatmeal.  ? 3 or more servings of vegetables, especially leafy green vegetables. ? 2 or more servings of fruits. ? 3 or more servings of milk, yogurt, or cheese. ? 2 or more servings of meat, turkey, chicken, fish, eggs, or dried beans.  Drink plenty of fluids, especially water. Avoid sodas and other sweetened drinks.  Choose foods that have important vitamins for your baby, such as calcium, iron, and folate.   ? Dairy products, tofu, canned fish with bones, almonds, broccoli, dark leafy greens, corn tortillas, and fortified orange juice are good sources of calcium. ? Beef, poultry, liver, spinach, lentils, dried beans, fortified cereals, and dried fruits are rich in iron. ? Dark leafy greens, broccoli, asparagus, liver, fortified cereals, orange juice, peanuts, and almonds are good sources of folate.  Avoid foods that could harm your baby. ? Do not eat raw or undercooked meat, chicken, or fish (such as sushi or raw oysters). ? Do not eat raw eggs or foods that contain raw eggs, such as Caesar dressing. ? Do not eat soft cheeses and unpasteurized dairy foods, such as Brie, feta, or blue cheese. ? Do not eat fish that contains a lot of mercury, such as shark, swordfish, tilefish, or fer mackerel. Limit some other types of fish, such as white (albacore) tuna, to 4 oz (0.1 kg) a week. ? Do not eat raw sprouts, especially alfalfa sprouts. ? Cut down on caffeine, such as coffee, tea, and cola. Protect yourself and your baby   Do not touch roberth litter or cat feces. They can cause an infection that could harm your baby.  Avoid things that can make your body too hot and may be harmful to your baby, such as a hot tub or sauna. Or talk with your doctor before doing anything that raises your body temperature. Your doctor can tell you if it's safe. [place] with morning sickness   Sip small amounts of water, juices, or shakes. Try drinking between meals, not with meals.  Eat 5 or 6 small meals a day. Try dry toast or crackers when you first get up, and eat breakfast a little later.  Avoid spicy, greasy, and fatty foods.  When you feel sick, open your windows or go for a short walk to get fresh air.  Try nausea wristbands. These help some people.  Tell your doctor if you think your prenatal vitamins make you sick. Where can you learn more? Go to https://rayna.health-partners. org and sign in to your Social Touch account. Enter G112 in the [facility] box to learn more about \"Weeks 6 to 10 of Your Pregnancy: Care Instructions. \"     If you do done between 15 and 22 weeks of pregnancy. It checks the amount of four substances in your blood. The doctor looks at these test results, along with your age and other factors, to find out the chance that your baby may have certain problems. ? Amniocentesis. This diagnostic test is used to look for chromosomal problems in the baby's cells. It can be done between 15 and 20 weeks of pregnancy, usually around week 16.  ? Nuchal translucency test. This test uses ultrasound to measure the thickness of the area at the back of the baby's neck. An increase in the thickness can be an early sign of Down syndrome. ? Chorionic villus sampling (CVS). This is a test that looks for certain genetic problems with your baby. The same genes that are in your baby are in the placenta. A small piece of the placenta is taken out and tested. This test is done when you are 10 to 13 weeks pregnant. Ease discomfort   Slow down and take naps when you feel tired.  If your emotions swing, talk to someone.  If your gums bleed, try a softer toothbrush. If your gums are puffy and bleed a lot, see your dentist.  Philip If you feel dizzy:  ? Get up slowly after sitting or lying down. ? Drink plenty of fluids. ? Eat small snacks to keep your blood sugar stable. ? Put your head between your legs as though you were tying your shoelaces. ? Lie down with your legs higher than your head. Use pillows to prop up your feet.  If you have a headache:  ? Lie down. ? Ask your partner or a good friend for a neck massage. ? Try cool cloths over your forehead or across the back of your neck. ? Use acetaminophen (Tylenol) for pain relief. Do not use nonsteroidal anti-inflammatory drugs (NSAIDs), such as ibuprofen (Advil, Motrin) or naproxen (Aleve), unless your doctor says it is okay.  If you have a nosebleed, pinch your nose gently, and hold it for a short while.  To prevent nosebleeds, try massaging a small dab of petroleum jelly, such as Vaseline, in your nostrils.  If your nose is stuffed up, try saline (saltwater) nose sprays. Do not use decongestant sprays. Care for your breasts   Wear a bra that gives you good support.  Know that changes in your breasts are normal.  ? Your breasts may get larger and more tender. Tenderness usually gets better by 12 weeks. ? Your nipples may get darker and larger, and small bumps around your nipples may show more. ? The veins in your chest and breasts may show more. Where can you learn more? Go to https://[employer] Precision ManufacturingpeGiftangoeb.[employer]. org and sign in to your Vault Dragon account. Enter Z803 in the [facility] box to learn more about \"Weeks 10 to 14 of Your Pregnancy: Care Instructions. \"     If you do not have an account, please click on the \"Sign Up Now\" link. Current as of: June 16, 2021               Content Version: 13.2  © 2006-2022 Healthwise, Incorporated. Care instructions adapted under license by [facility] ([facility]). If you have questions about a medical condition or this instruction, always ask your healthcare professional. [place] [address] any warranty or liability for your use of this information.

## 2022-04-18 NOTE — PROGRESS NOTES
Meryle Cypher is here at [facility] for:    Chief Complaint   Patient presents with    Routine Prenatal Visit     F/U in house dating u/s. we will do a TBE-PAP at next visit. pt has LLQ pain on her left side more when she coughs        Estimated Due Date: Estimated Date of Delivery: 22    OB History    Para Term  AB Living   5 4 3 1   4   SAB IAB Ectopic Molar Multiple Live Births           0 4      # Outcome Date GA Lbr Taz/2nd Weight Sex Delivery Anes PTL Lv   5 Current            4 Term 18 39w1d  7 lb 2.3 oz (3.239 kg) F CS-LTranv Spinal  DAY   3 Term 04/10/15 39w0d  8 lb 4 oz (3.742 kg) M CS-LTranv Spinal N DAY   2 Term 14 39w2d  7 lb 6.6 oz (3.362 kg) M CS-LTranv Spinal N DAY   1  13 36w0d  6 lb 3.8 oz (2.829 kg) M CS-LTranv Spinal N DAY      Complications: Fetal Intolerance, Autism        Past Medical History:   Diagnosis Date    Herpes        Past Surgical History:   Procedure Laterality Date     SECTION, LOW TRANSVERSE  2013     SECTION, LOW TRANSVERSE  2014     SECTION, LOW TRANSVERSE  4/10/2015    [place]  DELIVERY ONLY N/A 2018     SECTION-REPEAT performed by Danny Burrell MD at [facility]       Social History     Tobacco Use   Smoking Status Former Smoker    Packs/day: 0.25    Years: 0.50    Pack years: 0.12    Types: Cigarettes    Quit date: 6/10/2014    Years since quittin.8   Smokeless Tobacco Never Used        Social History     Substance and Sexual Activity   Alcohol Use No    Alcohol/week: 0.0 standard drinks       No results found for this visit on 22. HPI: Patient doing well today. Patient denies need to    Yes PT denies fever, chills, nausea and vomiting       Vitals:  Estimated body mass index is 28.79 kg/m² as calculated from the following:    Height as of 22: 5' 5\" (1.651 m). Weight as of this encounter: 173 lb (78.5 kg).   BP: 98/60  Weight: 173 lb (78.5 kg)  Patient Position: Sitting  Albumin: Negative  Glucose: Negative  Fetal Heart Rate: 174  Movement: Absent           Abdomen: Soft nontender patient states left-sided tenderness    Results reviewed today:    8.3 WK IUP  CL:4.3cm  HR:174bpm  RT. OVARY:seen, wnl  LT. OVARY:seen, large cyst shirley- 4.5cm x 4.4cm x 3.9cm         ASSESSMENT & Plan    Diagnosis Orders   1. 8 weeks gestation of pregnancy     2. S/P repeat low transverse      3. 20 weeks gestation of pregnancy  US OB 14 PLUS WEEKS SINGLE OR FIRST GESTATION   4. Screening, , for fetal anatomic survey  US OB 14 PLUS WEEKS SINGLE OR FIRST GESTATION   5. 30 weeks gestation of pregnancy  US OB FOLLOW UP TRANSABDOMINAL APPROACH   6. Encounter for ultrasound to assess fetal growth  US OB FOLLOW UP TRANSABDOMINAL APPROACH   7. Left ovarian cyst               I am having Florina Ervin maintain her Prenatal MV-Min-Fe Fum-FA-DHA (PRENATAL 1 PO) and Multiple Vitamins-Minerals (MULTIVITAMIN WOMEN PO). Return in about 4 weeks (around 2022) for OB, OB 20 wk  & 30 wk with tdap . Patient Instructions     Patient Education        Weeks 6 to 10 of Your Pregnancy: Care Instructions  Overview     During the first 6 to 10 weeks of your pregnancy, your body goes through many changes. Your baby grows very quickly, even though you can't feel it yet. You may start to feel different, both in your body and your emotions. Because each pregnancy is unique, there's no right way to feel. You may feel the healthiest you've ever been, or you might feel tired or sick to your stomach (\"morningsickness\"). These early weeks are a time to make healthy choices and to eat the best foodsfor you and your baby. This is also a good time to think about birth defects testing. These are tests done during pregnancy to look for possible problems with the baby.  First-trimester tests for birth defects can be done between 8 and 13 weeks of pregnancy, depending on the test. Talk with your doctor about what kinds oftests are available. Follow-up care is a key part of your treatment and safety. Be sure to make and go to all appointments, and call your doctor if you are having problems. It's also a good idea to know your test results and keep alist of the medicines you take. How can you care for yourself at home? Eat well   Eat at least 3 meals and 2 healthy snacks every day. Eat fresh, whole foods, including:  ? 7 or more servings of bread, tortillas, cereal, rice, pasta, or oatmeal.  ? 3 or more servings of vegetables, especially leafy green vegetables. ? 2 or more servings of fruits. ? 3 or more servings of milk, yogurt, or cheese. ? 2 or more servings of meat, turkey, chicken, fish, eggs, or dried beans.  Drink plenty of fluids, especially water. Avoid sodas and other sweetened drinks.  Choose foods that have important vitamins for your baby, such as calcium, iron, and folate. ? Dairy products, tofu, canned fish with bones, almonds, broccoli, dark leafy greens, corn tortillas, and fortified orange juice are good sources of calcium. ? Beef, poultry, liver, spinach, lentils, dried beans, fortified cereals, and dried fruits are rich in iron. ? Dark leafy greens, broccoli, asparagus, liver, fortified cereals, orange juice, peanuts, and almonds are good sources of folate.  Avoid foods that could harm your baby. ? Do not eat raw or undercooked meat, chicken, or fish (such as sushi or raw oysters). ? Do not eat raw eggs or foods that contain raw eggs, such as Caesar dressing. ? Do not eat soft cheeses and unpasteurized dairy foods, such as Brie, feta, or blue cheese. ? Do not eat fish that contains a lot of mercury, such as shark, swordfish, tilefish, or fer mackerel. Limit some other types of fish, such as white (albacore) tuna, to 4 oz (0.1 kg) a week. ? Do not eat raw sprouts, especially alfalfa sprouts.   ? Cut down on caffeine, such as coffee, tea, and cola. Protect yourself and your baby   Do not touch roberth litter or cat feces. They can cause an infection that could harm your baby.  Avoid things that can make your body too hot and may be harmful to your baby, such as a hot tub or sauna. Or talk with your doctor before doing anything that raises your body temperature. Your doctor can tell you if it's safe. [place] with morning sickness   Sip small amounts of water, juices, or shakes. Try drinking between meals, not with meals.  Eat 5 or 6 small meals a day. Try dry toast or crackers when you first get up, and eat breakfast a little later.  Avoid spicy, greasy, and fatty foods.  When you feel sick, open your windows or go for a short walk to get fresh air.  Try nausea wristbands. These help some people.  Tell your doctor if you think your prenatal vitamins make you sick. Where can you learn more? Go to https://WhipCar.Xeneta. org and sign in to your [employer] account. Enter G112 in the [facility] box to learn more about \"Weeks 6 to 10 of Your Pregnancy: Care Instructions. \"     If you do not have an account, please click on the \"Sign Up Now\" link. Current as of: June 16, 2021               Content Version: 13.2  © 2006-2022 [employer]. Care instructions adapted under license by [facility] ([facility]). If you have questions about a medical condition or this instruction, always ask your healthcare professional. [place] [address] any warranty or liability for your use of this information. Patient Education        Weeks 10 to 14 of Your Pregnancy: Care Instructions  Overview     By weeks 10 to 15 of your pregnancy, the placenta has formed inside your uterus. The placenta's main job is to give your baby oxygen and nutrientsthrough the umbilical cord. It's possible to hear your baby's heartbeat with a special ultrasound device. Your baby's organs are developing.  The arms and legs can bend.  This is a good time to think about testing for birth defects. There are two types of tests: screening and diagnostic. Screening tests show the chance that a baby has a certain birth defect. They can't tell you for sure that your babyhas a problem. Diagnostic tests show if a baby has a certain birth defect. It's your choice whether to have these tests. You and your partner can talk toyour doctor or midwife about tests for birth defects. Follow-up care is a key part of your treatment and safety. Be sure to make and go to all appointments, and call your doctor if you are having problems. It's also a good idea to know your test results and keep alist of the medicines you take. How can you care for yourself at home? Decide about tests   You can have screening tests and diagnostic tests to check for birth defects. The decision to have a test for birth defects is personal. Think about your age, your chance of passing on a family disease, your need to know about any problems, and what you might do after you have the test results. ? Quadruple (quad) blood test. This screening test can be done between 15 and 22 weeks of pregnancy. It checks the amount of four substances in your blood. The doctor looks at these test results, along with your age and other factors, to find out the chance that your baby may have certain problems. ? Amniocentesis. This diagnostic test is used to look for chromosomal problems in the baby's cells. It can be done between 15 and 20 weeks of pregnancy, usually around week 16.  ? Nuchal translucency test. This test uses ultrasound to measure the thickness of the area at the back of the baby's neck. An increase in the thickness can be an early sign of Down syndrome. ? Chorionic villus sampling (CVS). This is a test that looks for certain genetic problems with your baby. The same genes that are in your baby are in the placenta. A small piece of the placenta is taken out and tested.  This Version: 13.2  © 9613-2534 Healthwise, Incorporated. Care instructions adapted under license by [facility] ([facility]). If you have questions about a medical condition or this instruction, always ask your healthcare professional. [address] any warranty or liability for your use of this information.                    Tod Augustine, APRN - CNM,4/18/2022 2:01 PM

## 2022-05-18 ENCOUNTER — HOSPITAL ENCOUNTER (OUTPATIENT)
Age: 27
Setting detail: SPECIMEN
Discharge: HOME | End: 2022-05-18
Payer: COMMERCIAL

## 2022-05-18 ENCOUNTER — ROUTINE PRENATAL (OUTPATIENT)
Dept: OBGYN | Age: 27
End: 2022-05-18
Payer: COMMERCIAL

## 2022-05-18 VITALS — WEIGHT: 173.8 LBS | SYSTOLIC BLOOD PRESSURE: 110 MMHG | BODY MASS INDEX: 28.92 KG/M2 | DIASTOLIC BLOOD PRESSURE: 62 MMHG

## 2022-05-18 DIAGNOSIS — Z3A.12 12 WEEKS GESTATION OF PREGNANCY: Primary | ICD-10-CM

## 2022-05-18 DIAGNOSIS — Z3A.12 12 WEEKS GESTATION OF PREGNANCY: ICD-10-CM

## 2022-05-18 DIAGNOSIS — Z12.4 SCREENING FOR CERVICAL CANCER: ICD-10-CM

## 2022-05-18 PROCEDURE — G8419 CALC BMI OUT NRM PARAM NOF/U: HCPCS | Performed by: ADVANCED PRACTICE MIDWIFE

## 2022-05-18 PROCEDURE — G0145 SCR C/V CYTO,THINLAYER,RESCR: HCPCS

## 2022-05-18 PROCEDURE — 1036F TOBACCO NON-USER: CPT | Performed by: ADVANCED PRACTICE MIDWIFE

## 2022-05-18 PROCEDURE — G8427 DOCREV CUR MEDS BY ELIG CLIN: HCPCS | Performed by: ADVANCED PRACTICE MIDWIFE

## 2022-05-18 PROCEDURE — 99213 OFFICE O/P EST LOW 20 MIN: CPT | Performed by: ADVANCED PRACTICE MIDWIFE

## 2022-05-18 NOTE — PATIENT INSTRUCTIONS
Patient Education        Weeks 14 to 18 of Your Pregnancy: Care Instructions  Overview     During this time, you may start to \"show,\" so that you look pregnant to people around you. You may also notice some changes in your skin, such as itchy spotson your palms or acne on your face. Your baby is now able to pass urine. And your baby's first stool (meconium) is starting to collect in your baby's intestines. Hair is also starting to grow onyour baby's head. At your next visit, between weeks 18 and 20, your doctor may do an ultrasound test. The test allows your doctor to check for certain problems. Your doctor can also tell the sex of your baby. So this a good time to think about whetheryou want to know. Talk to your doctor about getting a flu shot to help keep you healthy duringyour pregnancy. As your pregnancy moves along, it's common to worry or feel anxious. Your body is changing a lot. And you are thinking about giving birth, the health of your baby, and becoming a parent. You can talk to your doctor about any anxiety andstress you feel. Follow-up care is a key part of your treatment and safety. Be sure to make and go to all appointments, and call your doctor if you are having problems. It's also a good idea to know your test results and keep alist of the medicines you take. How can you care for yourself at home? Reduce stress     Ask for help with cooking and housekeeping.      Figure out who or what causes your stress. Avoid these people or situations as much as possible.      Relax every day. Taking 10- to 15-minute breaks can make a big difference. Take a walk, listen to music, or take a warm bath.      Learn relaxation techniques at prenatal or yoga class. Or buy a relaxation tape.      List your fears about having a baby and becoming a parent. Share the list with someone you trust. Decide which worries are really small, and try to let them go.    Exercise     If you did not exercise much before pregnancy, start slowly. Walking is best. [employer], and do a little more every day.      Brisk walking, easy jogging, low-impact aerobics, water aerobics, and yoga are good choices. Some sports, such as scuba diving, horseback riding, downhill skiing, gymnastics, and water skiing, are not a good idea.      Try to do at least 2½ hours a week of moderate exercise, such as a fast walk. One way to do this is to be active 30 minutes a day, at least 5 days a week.      Wear loose clothing. And wear shoes and a bra that provide good support.      Warm up and cool down to start and finish your exercise.      If you want to use weights, be sure to use light weights. They reduce stress on your joints. Stay at the best weight for you     Experts recommend that you gain about 1 pound a month during the first 3 months of your pregnancy.      Experts recommend that you gain about 1 pound a week during your last 6 months of pregnancy, for a total weight gain of 25 to 35 pounds.      If you are underweight, you will need to gain more weight (about 28 to 40 pounds).      If you are overweight, you may not need to gain as much weight (about 15 to 25 pounds).      If you are gaining weight too fast, use common sense. Exercise every day, and limit sweets, fast foods, and fats. Choose lean meats, fruits, and vegetables.      If you are having twins or more, your doctor may refer you to a dietitian. Where can you learn more? Go to https://NovogyseamusMaxxAthlete.Beyond the Rack. org and sign in to your [employer] account. Enter S360 in the [facility] box to learn more about \"Weeks 14 to 18 of Your Pregnancy: Care Instructions. \"     If you do not have an account, please click on the \"Sign Up Now\" link. Current as of: June 16, 2021               Content Version: 13.2  © 8039-9567 Healthwise, Incorporated. Care instructions adapted under license by [facility] ([facility]).  If you have questions about a medical condition or this instruction, always ask your healthcare professional. [place] [address] any warranty or liability for your use of this information.

## 2022-05-18 NOTE — PROGRESS NOTES
Santos Rowley is here at 12w5d for:    Chief Complaint   Patient presents with    Routine Prenatal Visit     TBE-PAP, pt states no issuses or concerns       Estimated Due Date: Estimated Date of Delivery: 22    OB History    Para Term  AB Living   5 4 3 1   4   SAB IAB Ectopic Molar Multiple Live Births           0 4      # Outcome Date GA Lbr Taz/2nd Weight Sex Delivery Anes PTL Lv   5 Current            4 Term 18 39w1d  7 lb 2.3 oz (3.239 kg) F CS-LTranv Spinal  DAY   3 Term 04/10/15 39w0d  8 lb 4 oz (3.742 kg) M CS-LTranv Spinal N DAY   2 Term 14 39w2d  7 lb 6.6 oz (3.362 kg) M CS-LTranv Spinal N DAY   1  13 36w0d  6 lb 3.8 oz (2.829 kg) M CS-LTranv Spinal N DAY      Complications: Fetal Intolerance, Autism        Past Medical History:   Diagnosis Date    Herpes        Past Surgical History:   Procedure Laterality Date     SECTION, LOW TRANSVERSE  2013     SECTION, LOW TRANSVERSE  2014     SECTION, LOW TRANSVERSE  4/10/2015    [place]  DELIVERY ONLY N/A 2018     SECTION-REPEAT performed [facility] Dank Stokes MD at [facility]       Social History     Tobacco Use   Smoking Status Former Smoker    Packs/day: 0.25    Years: 0.50    Pack years: 0.12    Types: Cigarettes    Quit date: 6/10/2014    Years since quittin.9   Smokeless Tobacco Never Used        Social History     Substance and Sexual Activity   Alcohol Use No    Alcohol/week: 0.0 standard drinks       No results found for this visit on 22. HPI: Doing well today. Patient denies needs or concerns at this time    Yes PT denies fever, chills, nausea and vomiting       Vitals:  Estimated body mass index is 28.92 kg/m² as calculated from the following:    Height as of 22: 5' 5\" (1.651 m). Weight as of this encounter: 173 lb 12.8 oz (78.8 kg).   BP: 110/62  Weight: 173 lb 12.8 oz (78.8 kg)  Patient Position: Sitting  Albumin: Negative  Glucose: Negative  Movement: Absent           Total body exam completed please see prenatal physical exam tab in visit navigator       Results reviewed today:    No results found for this visit on 05/18/22. ASSESSMENT & Plan    Diagnosis Orders   1. 12 weeks gestation of pregnancy  PAP SMEAR   2. Screening for cervical cancer  PAP SMEAR             I am having Kaitlin Ervin maintain her Prenatal MV-Min-Fe Fum-FA-DHA (PRENATAL 1 PO) and Multiple Vitamins-Minerals (MULTIVITAMIN WOMEN PO). Return in about 4 weeks (around 6/15/2022) for OB. Patient Instructions       Patient Education        Weeks 14 to 25 of Your Pregnancy: Care Instructions  Overview     During this time, you may start to \"show,\" so that you look pregnant to people around you. You may also notice some changes in your skin, such as itchy spotson your palms or acne on your face. Your baby is now able to pass urine. And your baby's first stool (meconium) is starting to collect in your baby's intestines. Hair is also starting to grow onyour baby's head. At your next visit, between weeks 18 and 20, your doctor may do an ultrasound test. The test allows your doctor to check for certain problems. Your doctor can also tell the sex of your baby. So this a good time to think about whetheryou want to know. Talk to your doctor about getting a flu shot to help keep you healthy duringyour pregnancy. As your pregnancy moves along, it's common to worry or feel anxious. Your body is changing a lot. And you are thinking about giving birth, the health of your baby, and becoming a parent. You can talk to your doctor about any anxiety andstress you feel. Follow-up care is a key part of your treatment and safety. Be sure to make and go to all appointments, and call your doctor if you are having problems. It's also a good idea to know your test results and keep alist of the medicines you take.   How can you care for yourself at home?  Reduce stress     Ask for help with cooking and housekeeping.      Figure out who or what causes your stress. Avoid these people or situations as much as possible.      Relax every day. Taking 10- to 15-minute breaks can make a big difference. Take a walk, listen to music, or take a warm bath.      Learn relaxation techniques at prenatal or yoga class. Or buy a relaxation tape.      List your fears about having a baby and becoming a parent. Share the list with someone you trust. Decide which worries are really small, and try to let them go. Exercise     If you did not exercise much before pregnancy, start slowly. Walking is best. [employer], and do a little more every day.      Brisk walking, easy jogging, low-impact aerobics, water aerobics, and yoga are good choices. Some sports, such as scuba diving, horseback riding, downhill skiing, gymnastics, and water skiing, are not a good idea.      Try to do at least 2½ hours a week of moderate exercise, such as a fast walk. One way to do this is to be active 30 minutes a day, at least 5 days a week.      Wear loose clothing. And wear shoes and a bra that provide good support.      Warm up and cool down to start and finish your exercise.      If you want to use weights, be sure to use light weights. They reduce stress on your joints. Stay at the best weight for you     Experts recommend that you gain about 1 pound a month during the first 3 months of your pregnancy.      Experts recommend that you gain about 1 pound a week during your last 6 months of pregnancy, for a total weight gain of 25 to 35 pounds.      If you are underweight, you will need to gain more weight (about 28 to 40 pounds).      If you are overweight, you may not need to gain as much weight (about 15 to 25 pounds).      If you are gaining weight too fast, use common sense. Exercise every day, and limit sweets, fast foods, and fats. Choose lean meats, fruits, and vegetables.    If you are having twins or more, your doctor may refer you to a dietitian. Where can you learn more? Go to https://chpepiceweb.healthPinion.gg. org and sign in to your [employer] account. Enter A073 in the [facility] box to learn more about \"Weeks 14 to 18 of Your Pregnancy: Care Instructions. \"     If you do not have an account, please click on the \"Sign Up Now\" link. Current as of: June 16, 2021               Content Version: 13.2  © 6601-1948 Healthwise, Incorporated. Care instructions adapted under license by [facility] ([facility]). If you have questions about a medical condition or this instruction, always ask your healthcare professional. [address] any warranty or liability for your use of this information.                    [id number] [place] 10:39 AM

## 2022-05-24 LAB — CYTOLOGY REPORT: NORMAL

## 2022-06-15 ENCOUNTER — ROUTINE PRENATAL (OUTPATIENT)
Dept: OBGYN | Age: 27
End: 2022-06-15
Payer: COMMERCIAL

## 2022-06-15 VITALS — DIASTOLIC BLOOD PRESSURE: 68 MMHG | BODY MASS INDEX: 28.46 KG/M2 | SYSTOLIC BLOOD PRESSURE: 112 MMHG | WEIGHT: 171 LBS

## 2022-06-15 DIAGNOSIS — Z3A.16 16 WEEKS GESTATION OF PREGNANCY: Primary | ICD-10-CM

## 2022-06-15 DIAGNOSIS — Z34.82 ENCOUNTER FOR SUPERVISION OF OTHER NORMAL PREGNANCY IN SECOND TRIMESTER: ICD-10-CM

## 2022-06-15 PROCEDURE — 1036F TOBACCO NON-USER: CPT | Performed by: ADVANCED PRACTICE MIDWIFE

## 2022-06-15 PROCEDURE — G8427 DOCREV CUR MEDS BY ELIG CLIN: HCPCS | Performed by: ADVANCED PRACTICE MIDWIFE

## 2022-06-15 PROCEDURE — G8419 CALC BMI OUT NRM PARAM NOF/U: HCPCS | Performed by: ADVANCED PRACTICE MIDWIFE

## 2022-06-15 PROCEDURE — 99213 OFFICE O/P EST LOW 20 MIN: CPT | Performed by: ADVANCED PRACTICE MIDWIFE

## 2022-06-15 NOTE — PROGRESS NOTES
Ga Hayes is here at 16w5d for:    Chief Complaint   Patient presents with    Routine Prenatal Visit     patient presents today for routine ob care. Estimated Due Date: Estimated Date of Delivery: 22    OB History    Para Term  AB Living   5 4 3 1   4   SAB IAB Ectopic Molar Multiple Live Births           0 4      # Outcome Date GA Lbr Taz/2nd Weight Sex Delivery Anes PTL Lv   5 Current            4 Term 18 39w1d  7 lb 2.3 oz (3.239 kg) F CS-LTranv Spinal  DAY   3 Term 04/10/15 39w0d  8 lb 4 oz (3.742 kg) M CS-LTranv Spinal N DAY   2 Term 14 39w2d  7 lb 6.6 oz (3.362 kg) M CS-LTranv Spinal N DAY   1  13 36w0d  6 lb 3.8 oz (2.829 kg) M CS-LTranv Spinal N DAY      Complications: Fetal Intolerance, Autism        Past Medical History:   Diagnosis Date    Herpes        Past Surgical History:   Procedure Laterality Date     SECTION, LOW TRANSVERSE  2013     SECTION, LOW TRANSVERSE  2014     SECTION, LOW TRANSVERSE  4/10/2015    [place]  DELIVERY ONLY N/A 2018     SECTION-REPEAT performed by Reagan Cash MD at [facility] L&D OR       Social History     Tobacco Use   Smoking Status Former Smoker    Packs/day: 0.25    Years: 0.50    Pack years: 0.12    Types: Cigarettes    Quit date: 6/10/2014    Years since quittin.0   Smokeless Tobacco Never Used        Social History     Substance and Sexual Activity   Alcohol Use No    Alcohol/week: 0.0 standard drinks       No results found for this visit on 06/15/22. HPI: Patient here today for OB visit. Patient denies needs or concerns at this time    Yes PT denies fever, chills, nausea and vomiting       Vitals:  Estimated body mass index is 28.46 kg/m² as calculated from the following:    Height as of 22: 5' 5\" (1.651 m). Weight as of this encounter: 171 lb (77.6 kg).   BP: 112/68  Weight: 171 lb (77.6 kg)  Patient Position: Sitting  Albumin: Negative  Glucose: Negative  Fetal Heart Rate: 160  Movement: Present           Abdomen: Soft nontender    Results reviewed today:    No results found for this visit on 06/15/22. ASSESSMENT & Plan    Diagnosis Orders   1. 16 weeks gestation of pregnancy     2. Encounter for supervision of other normal pregnancy in second trimester               I am having Ben Ervin maintain her Prenatal MV-Min-Fe Fum-FA-DHA (PRENATAL 1 PO) and Multiple Vitamins-Minerals (MULTIVITAMIN WOMEN PO). Return for Keep next appt. Patient Instructions     Patient Education        Weeks 18 to 22 of Your Pregnancy: Care Instructions  Overview     Your baby is continuing to develop quickly. Sometime between 18 and 22 weeks, you'll probably start to feel your baby move. At first, these small fetal movements feel like fluttering or \"butterflies. \" Or they may feel like gas bubbles. As your baby grows, these movements will become stronger. You may also notice that your baby hiccups. Babies at this stage cannow suck their thumbs. You may find that your nausea and fatigue are gone. You may feel better overall and have more energy than you did in your first trimester. But you might now also have some new discomforts, like sleep problems or leg cramps. Talk to yourdoctor about things you can do at home to ease these problems. Follow-up care is a key part of your treatment and safety. Be sure to make and go to all appointments, and call your doctor if you are having problems. It's also a good idea to know your test results and keep alist of the medicines you take. How can you care for yourself at home? Ease sleep problems   Avoid caffeine in drinks or chocolate late in the day.  Get some exercise every day.  Take a warm shower or bath before bed.  Have a light snack or glass of milk at bedtime.  Do relaxation exercises in bed to calm your mind and body.    Support your legs and back with extra pillows. Try a pillow between your legs if you sleep on your side.  Do not use sleeping pills or alcohol. They could harm your baby. Ease leg cramps   Do not massage your calf during the cramp.  Sit on a firm bed or chair. Straighten your leg, and bend your foot (flex your ankle) slowly upward, toward your knee. Bend your toes up and down.  Stand on a cool, flat surface. Stretch your toes upward, and take small steps walking on your heels.  Use a heating pad or hot water bottle to help with muscle ache. Prevent leg cramps   Be sure to get enough calcium. If you are worried that you are not getting enough, talk to your doctor.  Exercise every day, and stretch your legs before bed.  Take a warm bath before bed, and try leg warmers at night. Where can you learn more? Go to https://Code FeverpepicewBonica.co.Del Taco. org and sign in to your [employer] account. Enter J216 in the Mutracx box to learn more about \"Weeks 18 to 22 of Your Pregnancy: Care Instructions. \"     If you do not have an account, please click on the \"Sign Up Now\" link. Current as of: June 16, 2021               Content Version: 13.2  © 7647-9051 Healthwise, Incorporated. Care instructions adapted under license by [facility] ([facility]). If you have questions about a medical condition or this instruction, always ask your healthcare professional. [place] [address] any warranty or liability for your use of this information.                    [address] 1:01 PM

## 2022-06-15 NOTE — PATIENT INSTRUCTIONS
Patient Education        Weeks 18 to 22 of Your Pregnancy: Care Instructions  Overview     Your baby is continuing to develop quickly. Sometime between 18 and 22 weeks, you'll probably start to feel your baby move. At first, these small fetal movements feel like fluttering or \"butterflies. \" Or they may feel like gas bubbles. As your baby grows, these movements will become stronger. You may also notice that your baby hiccups. Babies at this stage cannow suck their thumbs. You may find that your nausea and fatigue are gone. You may feel better overall and have more energy than you did in your first trimester. But you might now also have some new discomforts, like sleep problems or leg cramps. Talk to yourdoctor about things you can do at home to ease these problems. Follow-up care is a key part of your treatment and safety. Be sure to make and go to all appointments, and call your doctor if you are having problems. It's also a good idea to know your test results and keep alist of the medicines you take. How can you care for yourself at home? Ease sleep problems   Avoid caffeine in drinks or chocolate late in the day.  Get some exercise every day.  Take a warm shower or bath before bed.  Have a light snack or glass of milk at bedtime.  Do relaxation exercises in bed to calm your mind and body.  Support your legs and back with extra pillows. Try a pillow between your legs if you sleep on your side.  Do not use sleeping pills or alcohol. They could harm your baby. Ease leg cramps   Do not massage your calf during the cramp.  Sit on a firm bed or chair. Straighten your leg, and bend your foot (flex your ankle) slowly upward, toward your knee. Bend your toes up and down.  Stand on a cool, flat surface. Stretch your toes upward, and take small steps walking on your heels.  Use a heating pad or hot water bottle to help with muscle ache. Prevent leg cramps   Be sure to get enough calcium.  If you are worried that you are not getting enough, talk to your doctor.  Exercise every day, and stretch your legs before bed.  Take a warm bath before bed, and try leg warmers at night. Where can you learn more? Go to https://chperosyeb.healthZighra. org and sign in to your [employer] account. Enter N611 in the Ximalaya box to learn more about \"Weeks 18 to 22 of Your Pregnancy: Care Instructions. \"     If you do not have an account, please click on the \"Sign Up Now\" link. Current as of: June 16, 2021               Content Version: 13.2  © 8218-7139 Healthwise, Incorporated. Care instructions adapted under license by [facility] ([facility]). If you have questions about a medical condition or this instruction, always ask your healthcare professional. [address] any warranty or liability for your use of this information.

## 2022-06-29 ENCOUNTER — TELEPHONE (OUTPATIENT)
Dept: OBGYN | Age: 27
End: 2022-06-29

## 2022-06-29 NOTE — TELEPHONE ENCOUNTER
Needs a repeat  on . She will be 37 weeks pregnant she has a history of a large uterine window with previous pregnancy.

## 2022-07-12 ENCOUNTER — ROUTINE PRENATAL (OUTPATIENT)
Dept: OBGYN | Age: 27
End: 2022-07-12
Payer: COMMERCIAL

## 2022-07-12 VITALS — SYSTOLIC BLOOD PRESSURE: 112 MMHG | BODY MASS INDEX: 29.09 KG/M2 | WEIGHT: 174.8 LBS | DIASTOLIC BLOOD PRESSURE: 62 MMHG

## 2022-07-12 DIAGNOSIS — Z3A.20 20 WEEKS GESTATION OF PREGNANCY: Primary | ICD-10-CM

## 2022-07-12 DIAGNOSIS — Z36.89 SCREENING, ANTENATAL, FOR FETAL ANATOMIC SURVEY: ICD-10-CM

## 2022-07-12 PROCEDURE — G8419 CALC BMI OUT NRM PARAM NOF/U: HCPCS | Performed by: ADVANCED PRACTICE MIDWIFE

## 2022-07-12 PROCEDURE — 99213 OFFICE O/P EST LOW 20 MIN: CPT | Performed by: ADVANCED PRACTICE MIDWIFE

## 2022-07-12 PROCEDURE — G8427 DOCREV CUR MEDS BY ELIG CLIN: HCPCS | Performed by: ADVANCED PRACTICE MIDWIFE

## 2022-07-12 PROCEDURE — 1036F TOBACCO NON-USER: CPT | Performed by: ADVANCED PRACTICE MIDWIFE

## 2022-07-12 NOTE — PATIENT INSTRUCTIONS
Patient Education        Learning About When to Call Your Doctor During Pregnancy (After 20 Weeks)  Overview  It's common to have concerns about what might be a problem when you're pregnant. Most pregnancies don't have any serious problems. But it's still important to know when to call your doctor if you have certain symptoms orsigns of labor. These are general suggestions. Your doctor may give you some more informationabout when to call. When to call your doctor (after 20 weeks)  Call 911 anytime you think you may need emergency care. For example, call if:   You have severe vaginal bleeding.  You have sudden, severe pain in your belly.  You passed out (lost consciousness).  You have a seizure.  You see or feel the umbilical cord.  You think you are about to deliver your baby and can't make it safely to the hospital.  Call your doctor now or seek immediate medical care if:   You have vaginal bleeding.  You have belly pain.  You have a fever.  You have symptoms of preeclampsia, such as:  ? Sudden swelling of your face, hands, or feet. ? New vision problems (such as dimness, blurring, or seeing spots). ? A severe headache.  You have a sudden release of fluid from your vagina. (You think your water broke.)   You think that you may be in labor. This means that you've had at least 6 contractions in an hour.  You notice that your baby has stopped moving or is moving much less than normal.   You have symptoms of a urinary tract infection. These may include:  ? Pain or burning when you urinate. ? A frequent need to urinate without being able to pass much urine. ? Pain in the flank, which is just below the rib cage and above the waist on either side of the back. ? Blood in your urine. Watch closely for changes in your health, and be sure to contact your doctor if:   You have vaginal discharge that smells bad.  You have skin changes, such as:  ? A rash. ? Itching.   ? Yellow color to your skin.   You have other concerns about your pregnancy. If you have labor signs at 37 weeks or more  If you have signs of labor at 37 weeks or more, your doctor may tell you tocall when your labor becomes more active. Symptoms of active labor include:   Contractions that are regular.  Contractions that are less than 5 minutes apart.  Contractions that are hard to talk through. Follow-up care is a key part of your treatment and safety. Be sure to make and go to all appointments, and call your doctor if you are having problems. It's also a good idea to know your test results and keep alist of the medicines you take. Where can you learn more? Go to https://Discount RampspeCoachUp.Alibaba Pictures Group Limited. org and sign in to your [employer]ior account. Enter  in the Meldium box to learn more about \"Learning About When to Call Your Doctor During Pregnancy (After 20 Weeks). \"     If you do not have an account, please click on the \"Sign Up Now\" link. Current as of: February 23, 2022               Content Version: 13.3  © 2006-2022 Healthwise, Incorporated. Care instructions adapted under license by [facility] ([facility]). If you have questions about a medical condition or this instruction, always ask your healthcare professional. [place] [address] any warranty or liability for your use of this information.

## 2022-07-12 NOTE — PROGRESS NOTES
Sheridan Reilly is here at [facility] for:    Chief Complaint   Patient presents with    Routine Prenatal Visit     patient presents today for routine ob care following in house 20wk u/s. Estimated Due Date: Estimated Date of Delivery: 22    OB History    Para Term  AB Living   5 4 3 1   4   SAB IAB Ectopic Molar Multiple Live Births           0 4      # Outcome Date GA Lbr Taz/2nd Weight Sex Delivery Anes PTL Lv   5 Current            4 Term 18 39w1d  7 lb 2.3 oz (3.239 kg) F CS-LTranv Spinal  DAY   3 Term 04/10/15 39w0d  8 lb 4 oz (3.742 kg) M CS-LTranv Spinal N DAY   2 Term 14 39w2d  7 lb 6.6 oz (3.362 kg) M CS-LTranv Spinal N DAY   1  13 36w0d  6 lb 3.8 oz (2.829 kg) M CS-LTranv Spinal N DAY      Complications: Fetal Intolerance, Autism        Past Medical History:   Diagnosis Date    Herpes        Past Surgical History:   Procedure Laterality Date     SECTION, LOW TRANSVERSE  2013     SECTION, LOW TRANSVERSE  2014     SECTION, LOW TRANSVERSE  4/10/2015    [place]  DELIV[place] ONLY N/A 2018     SECTION-REPEAT [facility] L&D OR       Social History     Tobacco Use   Smoking Status Former Smoker    Packs/day: 0.25    Years: 0.50    Pack years: 0.12    Types: Cigarettes    Quit date: 6/10/2014    Years since quittin.0   Smokeless Tobacco Never Used        Social History     Substance and Sexual Activity   Alcohol Use No    Alcohol/week: 0.0 standard drinks       No results found for this visit on 22. HPI: Patient here today for OB appointment. Patient denies needs or concerns    Yes PT denies fever, chills, nausea and vomiting       Vitals:  Estimated body mass index is 29.09 kg/m² as calculated from the following:    Height as of 22: 5' 5\" (1.651 m). Weight as of this encounter: 174 lb 12.8 oz (79.3 kg).   BP: 112/62  Weight: 174 lb 12.8 oz (79.3 kg)  Patient Position: Sitting  Albumin: Negative  Glucose: Negative  Fetal Heart Rate: 162  Movement: Present           Abdomen: soft    Results reviewed today:    2022 10:51 AM      20.3 WK IUP  CL: 3.8 cm  HR: 162 bpm  Anterior placenta, vertex presentation  Ovaries:  WNL, no cyst seen on lt  Gender:  female  Active fetal movements  All visualized fetal anatomy WNL            ASSESSMENT & Plan    Diagnosis Orders   1. 20 weeks gestation of pregnancy     2. Screening, , for fetal anatomic survey               I am having Sharon Ervin maintain her Prenatal MV-Min-Fe Fum-FA-DHA (PRENATAL 1 PO) and Multiple Vitamins-Minerals (MULTIVITAMIN WOMEN PO). Return in about 4 weeks (around 2022) for OB & 7 wk OB GTT. Patient Instructions     Patient Education        Learning About When to Call Your Doctor During Pregnancy (After 20 Weeks)  Overview  It's common to have concerns about what might be a problem when you're pregnant. Most pregnancies don't have any serious problems. But it's still important to know when to call your doctor if you have certain symptoms orsigns of labor. These are general suggestions. Your doctor may give you some more informationabout when to call. When to call your doctor (after 20 weeks)  Call 911 anytime you think you may need emergency care. For example, call if:   You have severe vaginal bleeding.  You have sudden, severe pain in your belly.  You passed out (lost consciousness).  You have a seizure.  You see or feel the umbilical cord.  You think you are about to deliver your baby and can't make it safely to the hospital.  Call your doctor now or seek immediate medical care if:   You have vaginal bleeding.  You have belly pain.  You have a fever.  You have symptoms of preeclampsia, such as:  ? Sudden swelling of your face, hands, or feet. ? New vision problems (such as dimness, blurring, or seeing spots). ? A severe headache.    You have a sudden release of fluid from your vagina. (You think your water broke.)   You think that you may be in labor. This means that you've had at least 6 contractions in an hour.  You notice that your baby has stopped moving or is moving much less than normal.   You have symptoms of a urinary tract infection. These may include:  ? Pain or burning when you urinate. ? A frequent need to urinate without being able to pass much urine. ? Pain in the flank, which is just below the rib cage and above the waist on either side of the back. ? Blood in your urine. Watch closely for changes in your health, and be sure to contact your doctor if:   You have vaginal discharge that smells bad.  You have skin changes, such as:  ? A rash. ? Itching. ? Yellow color to your skin.  You have other concerns about your pregnancy. If you have labor signs at 37 weeks or more  If you have signs of labor at 37 weeks or more, your doctor may tell you tocall when your labor becomes more active. Symptoms of active labor include:   Contractions that are regular.  Contractions that are less than 5 minutes apart.  Contractions that are hard to talk through. Follow-up care is a key part of your treatment and safety. Be sure to make and go to all appointments, and call your doctor if you are having problems. It's also a good idea to know your test results and keep alist of the medicines you take. Where can you learn more? Go to https://rayna.healthRed Butler. org and sign in to your [employer] [employer] account. Enter  in the [facility] box to learn more about \"Learning About When to Call Your Doctor During Pregnancy (After 20 Weeks). \"     If you do not have an account, please click on the \"Sign Up Now\" link. Current as of: February 23, 2022               Content Version: 13.3  © 8557-4385 Healthwise, Incorporated. Care instructions adapted under license by [facility] ([facility]).  If you have questions about a medical condition or this instruction, always ask your healthcare professional. [place] [address] any warranty or liability for your use of this information.                    [address] [place] [address] 12:12 PM

## 2022-08-16 ENCOUNTER — ROUTINE PRENATAL (OUTPATIENT)
Dept: OBGYN | Age: 27
End: 2022-08-16
Payer: COMMERCIAL

## 2022-08-16 VITALS
WEIGHT: 179 LBS | BODY MASS INDEX: 29.79 KG/M2 | HEART RATE: 74 BPM | SYSTOLIC BLOOD PRESSURE: 114 MMHG | DIASTOLIC BLOOD PRESSURE: 72 MMHG

## 2022-08-16 DIAGNOSIS — Z34.82 ENCOUNTER FOR SUPERVISION OF OTHER NORMAL PREGNANCY IN SECOND TRIMESTER: ICD-10-CM

## 2022-08-16 DIAGNOSIS — Z3A.25 25 WEEKS GESTATION OF PREGNANCY: Primary | ICD-10-CM

## 2022-08-16 PROCEDURE — G8419 CALC BMI OUT NRM PARAM NOF/U: HCPCS | Performed by: ADVANCED PRACTICE MIDWIFE

## 2022-08-16 PROCEDURE — 99213 OFFICE O/P EST LOW 20 MIN: CPT | Performed by: ADVANCED PRACTICE MIDWIFE

## 2022-08-16 PROCEDURE — G8427 DOCREV CUR MEDS BY ELIG CLIN: HCPCS | Performed by: ADVANCED PRACTICE MIDWIFE

## 2022-08-16 PROCEDURE — 1036F TOBACCO NON-USER: CPT | Performed by: ADVANCED PRACTICE MIDWIFE

## 2022-08-16 NOTE — PROGRESS NOTES
Devin Nelson is here at 25w4d for:    Chief Complaint   Patient presents with    Routine Prenatal Visit     \"I think the Franko Quivers has dropped\"       Estimated Due Date: Estimated Date of Delivery: 22    OB History    Para Term  AB Living   5 4 3 1   4   SAB IAB Ectopic Molar Multiple Live Births           0 4      # Outcome Date GA Lbr Taz/2nd Weight Sex Delivery Anes PTL Lv   5 Current            4 Term 18 39w1d  7 lb 2.3 oz (3.239 kg) F CS-LTranv Spinal  DAY   3 Term 04/10/15 39w0d  8 lb 4 oz (3.742 kg) M CS-LTranv Spinal N DAY   2 Term 14 39w2d  7 lb 6.6 oz (3.362 kg) M CS-LTranv Spinal N DAY   1  13 36w0d  6 lb 3.8 oz (2.829 kg) M CS-LTranv Spinal N DAY      Complications: Fetal Intolerance, Autism        Past Medical History:   Diagnosis Date    Herpes        Past Surgical History:   Procedure Laterality Date     SECTION, LOW TRANSVERSE  2013     SECTION, LOW TRANSVERSE  2014     SECTION, LOW TRANSVERSE  4/10/2015    [place]  DELIVERY ONLY N/A 2018     SECTION-REPEAT performed by Rodney Ferguson MD at [facility] AT THE [facility] L&D OR       Social History     Tobacco Use   Smoking Status Former    Packs/day: 0.25    Years: 0.50    Pack years: 0.13    Types: Cigarettes    Quit date: 6/10/2014    Years since quittin.1   Smokeless Tobacco Never        Social History     Substance and Sexual Activity   Alcohol Use No    Alcohol/week: 0.0 standard drinks       No results found for this visit on 22. HPI: Here today for OB visit. Patient states good fetal movements and denies needs today. Patient states she does feel like baby is lower in the pelvis    Yes PT denies fever, chills, nausea and vomiting       Vitals:  Estimated body mass index is 29.79 kg/m² as calculated from the following:    Height as of 22: 5' 5\" (1.651 m). Weight as of this encounter: 179 lb (81.2 kg).   BP: 114/72  Weight: 179 lb (81.2 kg)  Heart Rate: 74  Patient Position: Sitting  Albumin: Negative  Glucose: Negative  Fundal Height (cm): 27 cm  Fetal HR: 152  Movement: Present           Abdomen: soft    Results reviewed today:    No results found for this visit on 22. ASSESSMENT & Plan    Diagnosis Orders   1. 25 weeks gestation of pregnancy        2. Encounter for supervision of other normal pregnancy in second trimester                  I am having Varsha Ervin maintain her Prenatal MV-Min-Fe Fum-FA-DHA (PRENATAL 1 PO) and Multiple Vitamins-Minerals (MULTIVITAMIN WOMEN PO). Return for Keep next appt. There are no Patient Instructions on file for this visit. Clinical References           Weeks 26 to 30 of Your Pregnancy: Care Instructions  Overview     You are now entering your last trimester of pregnancy. Your baby is growing quickly. Nehal Owens probably feel your baby moving around more often. Your doctormay ask you to count your baby's kicks. Your back may ache as your body gets used to your baby's size and length. If you haven't already had the Tdap shot during this pregnancy, talk to your doctor about getting it. It will help protect your  against pertussisinfection. During this time, it's important to take care of yourself and pay attention to what your body needs. If you feel sexual, you can explore ways to be close withyour partner that match your comfort and desire. Follow-up care is a key part of your treatment and safety. Be sure to make and go to all appointments, and call your doctor if you are having problems. It's also a good idea to know your test results and keep alist of the medicines you take. How can you care for yourself at home? Take it easy at work  Take frequent breaks. If possible, stop working when you are tired, and rest during your lunch hour. Take bathroom breaks every 2 hours. Change positions often. If you sit for long periods, stand up and walk around.   When you stand for a long time, keep one foot on a low stool with your knee bent. After standing a lot, sit with your feet up. Avoid fumes, chemicals, and tobacco smoke. Be sexual in your own way  Having sex during pregnancy is okay, unless your doctor tells you not to. You may be very interested in sex, or you may have no interest at all. Your growing belly can make it hard to find a good position during intercourse. [place] and explore. You may get cramps in your uterus when your partner touches your breasts. A back rub may relieve the backache or cramps that sometimes follow orgasm. Learn about  labor  Watch for signs of  labor. You may be going into labor if:  You have menstrual-like cramps, with or without nausea. You have about 6 or more contractions in 1 hour, even after you have had a glass of water and are resting. You have a low, dull backache that does not go away when you change your position. You have pain or pressure in your pelvis that comes and goes in a pattern. You have intestinal cramping or flu-like symptoms, with or without diarrhea. You notice an increase or change in your vaginal discharge. Discharge may be heavy, mucus-like, watery, or streaked with blood. Your water breaks. If you think you have  labor:  Drink 2 or 3 glasses of water or juice. Not drinking enough fluids can cause contractions. Stop what you are doing, and empty your bladder. Then lie down on your left side for at least 1 hour. While lying on your side, find your breast bone. Put your fingers in the soft spot just below it. Move your fingers down toward your belly button to find the top of your uterus. Check to see if it is tight. Contractions can be weak or strong. Record your contractions for an hour. Time a contraction from the start of one contraction to the start of the next one. Single or several strong contractions without a pattern are called [place] contractions.  They are practice contractions but not the start of labor. They often stop if you change what you are doing. Call your doctor if you have regular contractions. Where can you learn more? Go to https://chpepicewsydney.healthDiagnosoft. org and sign in to your FiftyThree account. Enter W482 in the [facility] box to learn more about \"Weeks 26 to 30 of Your Pregnancy: Care Instructions. \"     If you do not have an account, please click on the \"Sign Up Now\" link. Current as of: February 23, 2022               Content Version: 13.3  © 0909-7212 [employer]First. Care instructions adapted under license by [facility] ([facility]). If you have questions about a medical condition or this instruction, always ask your healthcare professional. [address] any warranty or liability for your use of this information. Learning About Screening for Gestational Diabetes  What is gestational diabetes screening? Screening for gestational diabetes is a way to look for high blood sugar during pregnancy. You drink some very sweet liquid. Then you have a blood test to seehow your body uses sugar (glucose). How is gestational diabetes screening done? Screening for gestational diabetes may be done in a couple of ways. Two-part screening. Part one (glucose challenge test): A blood sample is taken after you drink a liquid that contains sugar (glucose). You don't need to stop eating or drinking before this test. If the test shows that you don't have a lot of sugar in your blood, you don't have gestational diabetes. Part two (oral glucose tolerance test, or OGTT): If the first test shows a lot of sugar in your blood, then you may have an OGTT. You can't eat or drink for at least 8 hours before this test. A blood sample is taken, then you drink a sweet liquid. You have more blood tests after 1 to 3 hours. If the OGTT shows that you have a lot of sugar in your blood, you may have gestational diabetes.   One-part screening. Sometimes doctors use the OGTT on its own. If the test shows that you don't have a lot of sugar in your blood, you don't have gestational diabetes. If youdo have a lot of sugar in your blood, you may have the condition. What are the risks of screening? Your blood glucose level may drop very low toward the end of the test. If this happens, you may feel weak, hungry, and restless. Tell your doctor if you havethese symptoms. The test usually will be stopped. You may vomit after drinking the sweet liquid. If this happens, you may need totake the test at a later time. Your doctor may do more glucose tests at other times during your pregnancy. Follow-up care is a key part of your treatment and safety. Be sure to make and go to all appointments, and call your doctor if you are having problems. It's also a good idea to know your test results and keep alist of the medicines you take. Where can you learn more? Go to https://SANDOW.Stylistpick. org and sign in to your GRAYL account. Enter X269 in the ePatientFinder box to learn more about \"Learning About Screening for Gestational Diabetes. \"     If you do not have an account, please click on the \"Sign Up Now\" link. Current as of: July 28, 2021               Content Version: 13.3  © 0946-4252 Healthwise, Incorporated. Care instructions adapted under license by [facility] ([facility]). If you have questions about a medical condition or this instruction, always ask your healthcare professional. [place] [address] any warranty or liability for your use of this information.                       [address] 3:05 PM

## 2022-08-30 ENCOUNTER — ROUTINE PRENATAL (OUTPATIENT)
Dept: OBGYN | Age: 27
End: 2022-08-30
Payer: COMMERCIAL

## 2022-08-30 VITALS
DIASTOLIC BLOOD PRESSURE: 62 MMHG | SYSTOLIC BLOOD PRESSURE: 102 MMHG | BODY MASS INDEX: 29.95 KG/M2 | HEART RATE: 88 BPM | WEIGHT: 180 LBS

## 2022-08-30 DIAGNOSIS — Z3A.27 27 WEEKS GESTATION OF PREGNANCY: Primary | ICD-10-CM

## 2022-08-30 DIAGNOSIS — Z13.1 ENCOUNTER FOR SCREENING EXAMINATION FOR IMPAIRED GLUCOSE REGULATION AND DIABETES MELLITUS: ICD-10-CM

## 2022-08-30 DIAGNOSIS — Z98.891 S/P REPEAT LOW TRANSVERSE C-SECTION: ICD-10-CM

## 2022-08-30 LAB
CHP ED QC CHECK: NORMAL
GLUCOSE BLD-MCNC: 96 MG/DL
HGB, POC: 10.6

## 2022-08-30 PROCEDURE — G8427 DOCREV CUR MEDS BY ELIG CLIN: HCPCS | Performed by: ADVANCED PRACTICE MIDWIFE

## 2022-08-30 PROCEDURE — 1036F TOBACCO NON-USER: CPT | Performed by: ADVANCED PRACTICE MIDWIFE

## 2022-08-30 PROCEDURE — 99213 OFFICE O/P EST LOW 20 MIN: CPT | Performed by: ADVANCED PRACTICE MIDWIFE

## 2022-08-30 PROCEDURE — 85018 HEMOGLOBIN: CPT | Performed by: ADVANCED PRACTICE MIDWIFE

## 2022-08-30 PROCEDURE — G8419 CALC BMI OUT NRM PARAM NOF/U: HCPCS | Performed by: ADVANCED PRACTICE MIDWIFE

## 2022-08-30 PROCEDURE — 82962 GLUCOSE BLOOD TEST: CPT | Performed by: ADVANCED PRACTICE MIDWIFE

## 2022-08-30 NOTE — PROGRESS NOTES
Sushila Martinez is here at 27w4d for:    Chief Complaint   Patient presents with    Routine Prenatal Visit     GTT, pt states no issues or concerns        Estimated Due Date: Estimated Date of Delivery: 22    OB History    Para Term  AB Living   5 4 3 1   4   SAB IAB Ectopic Molar Multiple Live Births           0 4      # Outcome Date GA Lbr Taz/2nd Weight Sex Delivery Anes PTL Lv   5 Current            4 Term 18 39w1d  7 lb 2.3 oz (3.239 kg) F CS-LTranv Spinal  DAY   3 Term 04/10/15 39w0d  8 lb 4 oz (3.742 kg) M CS-LTranv Spinal N DAY   2 Term 14 39w2d  7 lb 6.6 oz (3.362 kg) M CS-LTranv Spinal N DAY   1  13 36w0d  6 lb 3.8 oz (2.829 kg) M CS-LTranv Spinal N DAY      Complications: Fetal Intolerance, Autism        Past Medical History:   Diagnosis Date    Herpes        Past Surgical History:   Procedure Laterality Date     SECTION, LOW TRANSVERSE  2013     SECTION, LOW TRANSVERSE  2014     SECTION, LOW TRANSVERSE  4/10/2015    [place]  DELIVERY ONLY N/A 2018     SECTION-REPEAT performed by Jose Snow MD at [facility] AT THE [facility] L&D OR       Social History     Tobacco Use   Smoking Status Former    Packs/day: 0.25    Years: 0.50    Pack years: 0.13    Types: Cigarettes    Quit date: 6/10/2014    Years since quittin.2   Smokeless Tobacco Never        Social History     Substance and Sexual Activity   Alcohol Use No    Alcohol/week: 0.0 standard drinks       Results for orders placed or performed in visit on 22   POCT Glucose   Result Value Ref Range    Glucose 96 mg/dL    QC OK? POCT hemoglobin   Result Value Ref Range    Hemoglobin 10.6            HPI: Here today for OB visit and glucose testing. Patient denies needs or concerns at this time.   States she is feeling more pelvic pressure    Yes PT denies fever, chills, nausea and vomiting       Vitals:  Estimated body mass index is 29.95 kg/m² as calculated from the following:    Height as of 22: 5' 5\" (1.651 m). Weight as of this encounter: 180 lb (81.6 kg). BP: 102/62  Weight: 180 lb (81.6 kg)  Heart Rate: 88  Patient Position: Sitting  Albumin: Negative  Glucose: Negative  Fetal HR: 148  Movement: Present           Abdomen: soft    Results reviewed today:    Results for orders placed or performed in visit on 22   POCT Glucose   Result Value Ref Range    Glucose 96 mg/dL    QC OK? POCT hemoglobin   Result Value Ref Range    Hemoglobin 10.6            ASSESSMENT & Plan    Diagnosis Orders   1. 27 weeks gestation of pregnancy  POCT Glucose    POCT hemoglobin      2. Encounter for screening examination for impaired glucose regulation and diabetes mellitus  POCT Glucose      3. S/P repeat low transverse                   I am having Gustavo RachaelBriana Ervin maintain her Prenatal MV-Min-Fe Fum-FA-DHA (PRENATAL 1 PO) and Multiple Vitamins-Minerals (MULTIVITAMIN WOMEN PO). Return for Keep next appt. There are no Patient Instructions on file for this visit. Clinical References           Weeks 26 to 30 of Your Pregnancy: Care Instructions  Overview     You are now entering your last trimester of pregnancy. Your baby is growing quickly. Cole Denis probably feel your baby moving around more often. Your doctormay ask you to count your baby's kicks. Your back may ache as your body gets used to your baby's size and length. If you haven't already had the Tdap shot during this pregnancy, talk to your doctor about getting it. It will help protect your  against pertussisinfection. During this time, it's important to take care of yourself and pay attention to what your body needs. If you feel sexual, you can explore ways to be close withyour partner that match your comfort and desire. Follow-up care is a key part of your treatment and safety. Be sure to make and go to all appointments, and call your doctor if you are having problems.  It's also a good idea to know your test results and keep alist of the medicines you take. How can you care for yourself at home? Take it easy at work  Take frequent breaks. If possible, stop working when you are tired, and rest during your lunch hour. Take bathroom breaks every 2 hours. Change positions often. If you sit for long periods, stand up and walk around. When you stand for a long time, keep one foot on a low stool with your knee bent. After standing a lot, sit with your feet up. Avoid fumes, chemicals, and tobacco smoke. Be sexual in your own way  Having sex during pregnancy is okay, unless your doctor tells you not to. You may be very interested in sex, or you may have no interest at all. Your growing belly can make it hard to find a good position during intercourse. [place] and explore. You may get cramps in your uterus when your partner touches your breasts. A back rub may relieve the backache or cramps that sometimes follow orgasm. Learn about  labor  Watch for signs of  labor. You may be going into labor if:  You have menstrual-like cramps, with or without nausea. You have about 6 or more contractions in 1 hour, even after you have had a glass of water and are resting. You have a low, dull backache that does not go away when you change your position. You have pain or pressure in your pelvis that comes and goes in a pattern. You have intestinal cramping or flu-like symptoms, with or without diarrhea. You notice an increase or change in your vaginal discharge. Discharge may be heavy, mucus-like, watery, or streaked with blood. Your water breaks. If you think you have  labor:  Drink 2 or 3 glasses of water or juice. Not drinking enough fluids can cause contractions. Stop what you are doing, and empty your bladder. Then lie down on your left side for at least 1 hour. While lying on your side, find your breast bone. Put your fingers in the soft spot just below it.  Move your fingers down toward your belly button to find the top of your uterus. Check to see if it is tight. Contractions can be weak or strong. Record your contractions for an hour. Time a contraction from the start of one contraction to the start of the next one. Single or several strong contractions without a pattern are called [place] contractions. They are practice contractions but not the start of labor. They often stop if you change what you are doing. Call your doctor if you have regular contractions. Where can you learn more? Go to https://Security InnovationpeTaaz.Press Play. org and sign in to your Plair account. Enter K300 in the M-DISC box to learn more about \"Weeks 26 to 30 of Your Pregnancy: Care Instructions. \"     If you do not have an account, please click on the \"Sign Up Now\" link. Current as of: February 23, 2022               Content Version: 13.3  © 7342-2355 Healthwise, Incorporated. Care instructions adapted under license by [facility] ([facility]). If you have questions about a medical condition or this instruction, always ask your healthcare professional. [place] [address] any warranty or liability for your use of this information.                       REYNALDO Plasencia CNM,8/30/2022 11:31 AM

## 2022-09-20 ENCOUNTER — ROUTINE PRENATAL (OUTPATIENT)
Dept: OBGYN | Age: 27
End: 2022-09-20
Payer: COMMERCIAL

## 2022-09-20 VITALS
SYSTOLIC BLOOD PRESSURE: 102 MMHG | DIASTOLIC BLOOD PRESSURE: 60 MMHG | BODY MASS INDEX: 29.85 KG/M2 | WEIGHT: 179.4 LBS | HEART RATE: 70 BPM

## 2022-09-20 DIAGNOSIS — Z23 NEED FOR TDAP VACCINATION: ICD-10-CM

## 2022-09-20 DIAGNOSIS — Z98.891 S/P REPEAT LOW TRANSVERSE C-SECTION: ICD-10-CM

## 2022-09-20 DIAGNOSIS — Z3A.30 30 WEEKS GESTATION OF PREGNANCY: Primary | ICD-10-CM

## 2022-09-20 PROCEDURE — 99213 OFFICE O/P EST LOW 20 MIN: CPT | Performed by: ADVANCED PRACTICE MIDWIFE

## 2022-09-20 PROCEDURE — G8419 CALC BMI OUT NRM PARAM NOF/U: HCPCS | Performed by: ADVANCED PRACTICE MIDWIFE

## 2022-09-20 PROCEDURE — 90715 TDAP VACCINE 7 YRS/> IM: CPT | Performed by: ADVANCED PRACTICE MIDWIFE

## 2022-09-20 PROCEDURE — G8427 DOCREV CUR MEDS BY ELIG CLIN: HCPCS | Performed by: ADVANCED PRACTICE MIDWIFE

## 2022-09-20 PROCEDURE — 1036F TOBACCO NON-USER: CPT | Performed by: ADVANCED PRACTICE MIDWIFE

## 2022-09-20 NOTE — PROGRESS NOTES
Yocasta Ortiz is here at 30w4d for:    Chief Complaint   Patient presents with    Routine Prenatal Visit     F/U in house 30 week u/s. Pt states no issues or concerns, pt would like the tdap vaccine        Estimated Due Date: Estimated Date of Delivery: 22    OB History    Para Term  AB Living   5 4 3 1   4   SAB IAB Ectopic Molar Multiple Live Births           0 4      # Outcome Date GA Lbr Taz/2nd Weight Sex Delivery Anes PTL Lv   5 Current            4 Term 18 39w1d  7 lb 2.3 oz (3.239 kg) F CS-LTranv Spinal  DAY   3 Term 04/10/15 39w0d  8 lb 4 oz (3.742 kg) M CS-LTranv Spinal N DAY   2 Term 14 39w2d  7 lb 6.6 oz (3.362 kg) M CS-LTranv Spinal N DAY   1  13 36w0d  6 lb 3.8 oz (2.829 kg) M CS-LTranv Spinal N DAY      Complications: Fetal Intolerance, Autism        Past Medical History:   Diagnosis Date    Herpes        Past Surgical History:   Procedure Laterality Date     SECTION, LOW TRANSVERSE  2013     SECTION, LOW TRANSVERSE  2014     SECTION, LOW TRANSVERSE  4/10/2015    [place]  DELIVERY ONLY N/A 2018     SECTION-REPEAT performed by Alex Simmons MD at [facility] L&D OR       Social History     Tobacco Use   Smoking Status Former    Packs/day: 0.25    Years: 0.50    Pack years: 0.13    Types: Cigarettes    Quit date: 6/10/2014    Years since quittin.2   Smokeless Tobacco Never        Social History     Substance and Sexual Activity   Alcohol Use No    Alcohol/week: 0.0 standard drinks       No results found for this visit on 22. HPI: Here today for OB visit. Patient denies needs or concerns at this time ultrasound was completed and reviewed    Yes PT denies fever, chills, nausea and vomiting       Vitals:  Estimated body mass index is 29.85 kg/m² as calculated from the following:    Height as of 22: 5' 5\" (1.651 m). Weight as of this encounter: 179 lb 6.4 oz (81.4 kg).   BP: kicks  Your doctor may ask you to count your baby's movements, such as kicks, flutters, or rolls. Find a quiet place, and get comfortable. Write down your start time. Count your baby's movements (except hiccups). When your baby has moved 10 times, you can stop counting. Write down how many minutes it took. If an hour goes by and you don't feel 10 movements, have something to eat or drink. Count for another hour. If you don't feel at least 10 movements in the 2-hour period, call your doctor. Follow-up care is a key part of your treatment and safety. Be sure to make and go to all appointments, and call your doctor if you are having problems. It's also a good idea to know your test results and keep a list of the medicines you take. Where can you learn more? Go to [email or web address] and sign in to your Microinox account. Enter M976 in the Zetta.net box to learn more about \"Weeks 30 to 32 of Your Pregnancy: Care Instructions. \"     If you do not have an account, please click on the \"Sign Up Now\" link. Current as of: February 23, 2022               Content Version: 13.4  © 2006-2022 [employer]. Care instructions adapted under license by [facility] ([facility]). If you have questions about a medical condition or this instruction, always ask your healthcare professional. [place] [address] any warranty or liability for your use of this information. Tdap (Tetanus, Diphtheria, Pertussis) Vaccine: What You Need to Know  Why get vaccinated? Tdap vaccine can prevent tetanus, diphtheria, and pertussis. Diphtheria and pertussis spread from person to person. Tetanus enters the body through cuts or wounds. TETANUS (T) causes painful stiffening of the muscles. Tetanus can lead to serious health problems, including being unable to open the mouth, having trouble swallowing and breathing, or death.   DIPHTHERIA (D) can lead to difficulty breathing, heart failure, paralysis, or death. PERTUSSIS (aP), also known as \"whooping cough,\" can cause uncontrollable, violent coughing that makes it hard to breathe, eat, or drink. Pertussis can be extremely serious especially in babies and young children, causing pneumonia, convulsions, brain damage, or death. In teens and adults, it can cause weight loss, loss of bladder control, passing out, and rib fractures from severe coughing. Tdap vaccine  Tdap is only for children 7 years and older, adolescents, and adults. Adolescents should receive a single dose of Tdap, preferably at age 6 or 15 years. Pregnant people should get a dose of Tdap during every pregnancy, preferably during the early part of the third trimester, to help protect the  from pertussis. Infants are most at risk for severe, life-threatening complications from pertussis. Adults who have never received Tdap should get a dose of Tdap. Also, adults should receive a booster dose of either Tdap or Td (a different vaccine that protects against tetanus and diphtheria but not pertussis) every 10 years, or after 5 years in the case of a severe or dirty wound or burn. Tdap may be given at the same time as other vaccines.   Talk with your health care provider  Tell your vaccination provider if the person getting the vaccine:  Has had an allergic reaction after a previous dose of any vaccine that protects against tetanus, diphtheria, or pertussis, or has any severe, life-threatening allergies  Has had a coma, decreased level of consciousness, or prolonged seizures within 7 days after a previous dose of any pertussis vaccine (DTP, DTaP, or Tdap)  Has seizures or another nervous system problem  Has ever had Guillain-Barré Syndrome (also called \"GBS\")  Has had severe pain or swelling after a previous dose of any vaccine that protects against tetanus or diphtheria  In some cases, your health care provider may decide to postpone Tdap vaccination until a future visit. People with minor illnesses, such as a cold, may be vaccinated. People who are moderately or severely ill should usually wait until they recover before getting Tdap vaccine. Your health care provider can give you more information. Risks of a vaccine reaction  Pain, redness, or swelling where the shot was given, mild fever, headache, feeling tired, and nausea, vomiting, diarrhea, or stomachache sometimes happen after Tdap vaccination. People sometimes faint after medical procedures, including vaccination. Tell your provider if you feel dizzy or have vision changes or ringing in the ears. As with any medicine, there is a very remote chance of a vaccine causing a severe allergic reaction, other serious injury, or death. What if there is a serious problem? An allergic reaction could occur after the vaccinated person leaves the clinic. If you see signs of a severe allergic reaction (hives, swelling of the face and throat, difficulty breathing, a fast heartbeat, dizziness, or weakness), call 9-1-1 and get the person to the nearest hospital.  For other signs that concern you, call your health care provider. Adverse reactions should be reported to the Vaccine Adverse Event Reporting System (VAERS). Your health care provider will usually file this report, or you can do it yourself. Visit the VAERS website at www.vaers. hhs.gov or call 1-614.583.4892. VAERS is only for reporting reactions, and VAERS staff members do not give medical advice. The National Vaccine Injury Compensation Program  The National Vaccine Injury Compensation Program (VICP) is a federal program that was created to compensate people who may have been injured by certain vaccines. Claims regarding alleged injury or death due to vaccination have a time limit for filing, which may be as short as two years. Visit the VICP website at www.hrsa.gov/vaccinecompensation or call 9-611.385.6333 to learn about the program and about filing a claim.   How can I learn more? Ask your health care provider. Call your local or state health department. Visit the website of the Food and Drug Administration (FDA) for vaccine package inserts and additional information at www.fda.gov/vaccines-blood-biologics/vaccines. Contact the Centers for Disease Control and Prevention (CDC): Call 3-748.513.8464 (1-800-CDC-INFO) or  Visit CDC's website at www.cdc.gov/vaccines. Vaccine Information Statement  Tdap (Tetanus, Diphtheria, Pertussis) Vaccine  8/6/2021  42 Briana Navarroayesha [id number]  [facility] and [facility] for Disease Control and Prevention  Many vaccine information statements are available in [place] and other languages. See www.immunize.org/vis  Hojas de información sobre vacunas están disponibles en español y en muchos otros idiomas. Visite www.immunize.org/vis  Care instructions adapted under license by [facility] ([facility]). If you have questions about a medical condition or this instruction, always ask your healthcare professional. [place] [address] any warranty or liability for your use of this information.                       [address] [facility] 10:35 AM

## 2022-10-04 ENCOUNTER — ROUTINE PRENATAL (OUTPATIENT)
Dept: OBGYN | Age: 27
End: 2022-10-04
Payer: COMMERCIAL

## 2022-10-04 VITALS
BODY MASS INDEX: 30.45 KG/M2 | WEIGHT: 183 LBS | DIASTOLIC BLOOD PRESSURE: 70 MMHG | SYSTOLIC BLOOD PRESSURE: 112 MMHG | HEART RATE: 82 BPM

## 2022-10-04 DIAGNOSIS — Z3A.32 32 WEEKS GESTATION OF PREGNANCY: Primary | ICD-10-CM

## 2022-10-04 DIAGNOSIS — Z98.891 S/P REPEAT LOW TRANSVERSE C-SECTION: ICD-10-CM

## 2022-10-04 PROCEDURE — G8419 CALC BMI OUT NRM PARAM NOF/U: HCPCS | Performed by: ADVANCED PRACTICE MIDWIFE

## 2022-10-04 PROCEDURE — 1036F TOBACCO NON-USER: CPT | Performed by: ADVANCED PRACTICE MIDWIFE

## 2022-10-04 PROCEDURE — G8427 DOCREV CUR MEDS BY ELIG CLIN: HCPCS | Performed by: ADVANCED PRACTICE MIDWIFE

## 2022-10-04 PROCEDURE — G8484 FLU IMMUNIZE NO ADMIN: HCPCS | Performed by: ADVANCED PRACTICE MIDWIFE

## 2022-10-04 PROCEDURE — 99213 OFFICE O/P EST LOW 20 MIN: CPT | Performed by: ADVANCED PRACTICE MIDWIFE

## 2022-10-04 NOTE — PROGRESS NOTES
Salinas Height is here at 32w4d for:    Chief Complaint   Patient presents with    Routine Prenatal Visit     Pt states no issues or concerns        Estimated Due Date: Estimated Date of Delivery: 22    OB History    Para Term  AB Living   5 4 3 1   4   SAB IAB Ectopic Molar Multiple Live Births           0 4      # Outcome Date GA Lbr Taz/2nd Weight Sex Delivery Anes PTL Lv   5 Current            4 Term 18 39w1d  7 lb 2.3 oz (3.239 kg) F CS-LTranv Spinal  DAY   3 Term 04/10/15 39w0d  8 lb 4 oz (3.742 kg) M CS-LTranv Spinal N DAY   2 Term 14 39w2d  7 lb 6.6 oz (3.362 kg) M CS-LTranv Spinal N DAY   1  13 36w0d  6 lb 3.8 oz (2.829 kg) M CS-LTranv Spinal N DAY      Complications: Fetal Intolerance, Autism        Past Medical History:   Diagnosis Date    Herpes        Past Surgical History:   Procedure Laterality Date     SECTION, LOW TRANSVERSE  2013     SECTION, LOW TRANSVERSE  2014     SECTION, LOW TRANSVERSE  4/10/2015    [place]  DELIVERY ONLY N/A 2018     SECTION-REPEAT performed by Toni Snow MD at [facility] AT THE [facility] L&D OR       Social History     Tobacco Use   Smoking Status Former    Packs/day: 0.25    Years: 0.50    Pack years: 0.13    Types: Cigarettes    Quit date: 6/10/2014    Years since quittin.3   Smokeless Tobacco Never        Social History     Substance and Sexual Activity   Alcohol Use No    Alcohol/week: 0.0 standard drinks       No results found for this visit on 10/04/22. HPI: Here today for OB visit. Patient states she is feeling well states she is having a little bit easier time breathing is wonder if the baby dropped    Yes PT denies fever, chills, nausea and vomiting       Vitals:  Estimated body mass index is 30.45 kg/m² as calculated from the following:    Height as of 22: 5' 5\" (1.651 m). Weight as of this encounter: 183 lb (83 kg).   BP: 112/70  Weight: 183 lb (83 kg)  Heart Rate: 82  Patient Position: Sitting  Albumin: Negative  Glucose: Negative  Fundal Height (cm): 32 cm  Fetal HR: 148  Movement: Present           Abdomen: soft    Results reviewed today:    No results found for this visit on 10/04/22. ASSESSMENT & Plan    Diagnosis Orders   1. 32 weeks gestation of pregnancy        2. S/P repeat low transverse                   I am having Miguelinaerne Keepers. Aziza maintain her Prenatal MV-Min-Fe Fum-FA-DHA (PRENATAL 1 PO) and Multiple Vitamins-Minerals (MULTIVITAMIN WOMEN PO). Return for Keep next appt. There are no Patient Instructions on file for this visit. Clinical References           Weeks 32 to 29 of Your Pregnancy: Care Instructions  Decide whether you want to bank or donate your baby's umbilical cord blood. If you want to save this blood, you have to arrange for it ahead of time. Decide about circumcision. Personal, Sabianism, or cultural beliefs may play a role in your decision. You get to decide what you want for your baby. Learn how to ease hemorrhoids. Get more liquids, fruits, vegetables, and fiber in your diet. Avoid sitting for too long. Clean yourself with moist toilet paper. Or try witch hazel pads. Try ice packs or warm sitz baths for discomfort. Use hydrocortisone cream for pain or itching. Ask your doctor about stool softeners. Consider the benefits of breastfeeding. It reduces your baby's risk of sudden infant death syndrome (SIDS).  babies are less likely to get certain infections. And they're less likely to be obese or get diabetes later in life. It can lower your risk of breast and ovarian cancers and osteoporosis. It saves you money. Follow-up care is a key part of your treatment and safety. Be sure to make and go to all appointments, and call your doctor if you are having problems. It's also a good idea to know your test results and keep a list of the medicines you take. Where can you learn more?   Go to [email or web address] and sign in to your T1 Visionshart account. Enter L500 in the [facility] box to learn more about \"Weeks 32 to 34 of Your Pregnancy: Care Instructions. \"     If you do not have an account, please click on the \"Sign Up Now\" link. Current as of: February 23, 2022               Content Version: 13.4  © 2006-2022 Healthwise, Incorporated. Care instructions adapted under license by [facility] ([facility]). If you have questions about a medical condition or this instruction, always ask your healthcare professional. [place] [address] any warranty or liability for your use of this information.                       REYNALDO Holloway CNM,10/4/2022 12:53 PM

## 2022-10-18 ENCOUNTER — ROUTINE PRENATAL (OUTPATIENT)
Dept: OBGYN | Age: 27
End: 2022-10-18
Payer: COMMERCIAL

## 2022-10-18 VITALS — DIASTOLIC BLOOD PRESSURE: 68 MMHG | BODY MASS INDEX: 30.79 KG/M2 | WEIGHT: 185 LBS | SYSTOLIC BLOOD PRESSURE: 108 MMHG

## 2022-10-18 DIAGNOSIS — Z34.83 ENCOUNTER FOR SUPERVISION OF OTHER NORMAL PREGNANCY, THIRD TRIMESTER: ICD-10-CM

## 2022-10-18 DIAGNOSIS — Z3A.34 34 WEEKS GESTATION OF PREGNANCY: Primary | ICD-10-CM

## 2022-10-18 DIAGNOSIS — Z98.891 S/P REPEAT LOW TRANSVERSE C-SECTION: ICD-10-CM

## 2022-10-18 PROCEDURE — 99213 OFFICE O/P EST LOW 20 MIN: CPT | Performed by: ADVANCED PRACTICE MIDWIFE

## 2022-10-18 PROCEDURE — G8484 FLU IMMUNIZE NO ADMIN: HCPCS | Performed by: ADVANCED PRACTICE MIDWIFE

## 2022-10-18 PROCEDURE — G8427 DOCREV CUR MEDS BY ELIG CLIN: HCPCS | Performed by: ADVANCED PRACTICE MIDWIFE

## 2022-10-18 PROCEDURE — 1036F TOBACCO NON-USER: CPT | Performed by: ADVANCED PRACTICE MIDWIFE

## 2022-10-18 PROCEDURE — G8419 CALC BMI OUT NRM PARAM NOF/U: HCPCS | Performed by: ADVANCED PRACTICE MIDWIFE

## 2022-10-18 NOTE — PROGRESS NOTES
Pt is here today for routine ob visit, pt is currently 34 w and 4 days. Pt is scheduled for repeat c section with Dr. Savanna Stanford to assist on 11/4/22 pt is aware.

## 2022-10-18 NOTE — PROGRESS NOTES
Shana Espinal is here at 34w4d for:    Chief Complaint   Patient presents with    Routine Prenatal Visit     Pt is here today for routine ob visit, pt is currently 29 w and 4 days. Pt is scheduled for repeat c section with Dr. Harrison Pereyra to assist on 22 pt is aware. Estimated Due Date: Estimated Date of Delivery: 22    OB History    Para Term  AB Living   5 4 3 1   4   SAB IAB Ectopic Molar Multiple Live Births           0 4      # Outcome Date GA Lbr Taz/2nd Weight Sex Delivery Anes PTL Lv   5 Current            4 Term 18 39w1d  7 lb 2.3 oz (3.239 kg) F CS-LTranv Spinal  DAY   3 Term 04/10/15 39w0d  8 lb 4 oz (3.742 kg) M CS-LTranv Spinal N DAY   2 Term 14 39w2d  7 lb 6.6 oz (3.362 kg) M CS-LTranv Spinal N DAY   1  13 36w0d  6 lb 3.8 oz (2.829 kg) M CS-LTranv Spinal N DAY      Complications: Fetal Intolerance, Autism        Past Medical History:   Diagnosis Date    Herpes        Past Surgical History:   Procedure Laterality Date     SECTION, LOW TRANSVERSE  2013     SECTION, LOW TRANSVERSE  2014     SECTION, LOW TRANSVERSE  4/10/2015    [place]  DELIVERY ONLY N/A 2018     SECTION-REPEAT performed by Misty Mederos MD at [facility] AT THE [facility] L&D OR       Social History     Tobacco Use   Smoking Status Former    Packs/day: 0.25    Years: 0.50    Pack years: 0.13    Types: Cigarettes    Quit date: 6/10/2014    Years since quittin.3   Smokeless Tobacco Never        Social History     Substance and Sexual Activity   Alcohol Use No    Alcohol/week: 0.0 standard drinks       No results found for this visit on 10/18/22. HPI: Pt here today and denies needs at this time. We did talk about her previous uterine window.  What to look for and what to come to labor and delivery for    Yes PT denies fever, chills, nausea and vomiting       Vitals:  Estimated body mass index is 30.79 kg/m² as calculated from the following:    Height as of 22: 5' 5\" (1.651 m). Weight as of this encounter: 185 lb (83.9 kg). BP: 108/68  Weight: 185 lb (83.9 kg)  Patient Position: Sitting  Albumin: Negative  Glucose: Negative  Fundal Height (cm): 34 cm  Fetal HR: 154  Movement: Present           Abdomen: soft    Results reviewed today:    No results found for this visit on 10/18/22. ASSESSMENT & Plan    Diagnosis Orders   1. 34 weeks gestation of pregnancy        2. S/P repeat low transverse         3. Encounter for supervision of other normal pregnancy, third trimester                  I am having Radha rEvin maintain her Prenatal MV-Min-Fe Fum-FA-DHA (PRENATAL 1 PO) and Multiple Vitamins-Minerals (MULTIVITAMIN WOMEN PO). Return for Keep next appt. There are no Patient Instructions on file for this visit. Clinical References           Weeks 34 to 39 of Your Pregnancy: Care Instructions  Your belly has grown quite large. It's almost time to give birth! Your baby's lungs are almost ready to breathe air. The skull bones are firm enough to protect your baby's head. But they're soft enough to move down through the birth canal.  You might be wondering what to expect during labor. Because each birth is different, there's no way to know exactly what childbirth will be like for you. Talk to your doctor or midwife about any concerns you have. Nidia Gonzalez probably have a test for group B streptococcus (GBS). GBS is bacteria that can live in the vagina and rectum. GBS can make your baby sick after birth. If you test positive, you'll get antibiotics during labor. Choose what type of pain relief you would like during labor. You can choose from a few types, including medicine and non-medicine options. You may want to use several types of pain relief. Know how medicines can help with pain during labor. Some medicines lower anxiety and help with some of the pain.  Others make your lower body numb so that you will feel less pain. Tell your doctor about your pain medicine choice. Do this before you start labor or very early in your labor. You may be able to change your mind during labor. Learn about the stages of labor. The first stage includes early labor, active labor, and transition. Contractions start in early labor. During active labor, contractions get stronger, last longer, and happen more often. In transition, your cervix stretches and contractions are very strong. The second stage starts when you're ready to push. During this stage, your baby is born. During the third stage, you'll have a few more contractions to push out the placenta. Follow-up care is a key part of your treatment and safety. Be sure to make and go to all appointments, and call your doctor if you are having problems. It's also a good idea to know your test results and keep a list of the medicines you take. Where can you learn more? Go to [email or web address] and sign in to your MyEveTab account. Enter G012 in the Cubikal box to learn more about \"Weeks 34 to 36 of Your Pregnancy: Care Instructions. \"     If you do not have an account, please click on the \"Sign Up Now\" link. Current as of: February 23, 2022               Content Version: 13.4  © 2006-2022 Healthwise, Incorporated. Care instructions adapted under license by [facility] ([facility]). If you have questions about a medical condition or this instruction, always ask your healthcare professional. [place] [address] any warranty or liability for your use of this information.                       REYNALDO Raphael CNM,10/18/2022 10:55 AM

## 2022-10-24 ENCOUNTER — ROUTINE PRENATAL (OUTPATIENT)
Dept: OBGYN | Age: 27
End: 2022-10-24
Payer: COMMERCIAL

## 2022-10-24 VITALS
SYSTOLIC BLOOD PRESSURE: 112 MMHG | BODY MASS INDEX: 30.79 KG/M2 | DIASTOLIC BLOOD PRESSURE: 70 MMHG | HEART RATE: 69 BPM | WEIGHT: 185 LBS

## 2022-10-24 DIAGNOSIS — Z3A.35 35 WEEKS GESTATION OF PREGNANCY: Primary | ICD-10-CM

## 2022-10-24 DIAGNOSIS — Z34.83 ENCOUNTER FOR SUPERVISION OF OTHER NORMAL PREGNANCY, THIRD TRIMESTER: ICD-10-CM

## 2022-10-24 PROCEDURE — G8427 DOCREV CUR MEDS BY ELIG CLIN: HCPCS | Performed by: ADVANCED PRACTICE MIDWIFE

## 2022-10-24 PROCEDURE — G8484 FLU IMMUNIZE NO ADMIN: HCPCS | Performed by: ADVANCED PRACTICE MIDWIFE

## 2022-10-24 PROCEDURE — G8419 CALC BMI OUT NRM PARAM NOF/U: HCPCS | Performed by: ADVANCED PRACTICE MIDWIFE

## 2022-10-24 PROCEDURE — 1036F TOBACCO NON-USER: CPT | Performed by: ADVANCED PRACTICE MIDWIFE

## 2022-10-24 PROCEDURE — 99213 OFFICE O/P EST LOW 20 MIN: CPT | Performed by: ADVANCED PRACTICE MIDWIFE

## 2022-11-01 ENCOUNTER — HOSPITAL ENCOUNTER (OUTPATIENT)
Age: 27
Setting detail: SPECIMEN
Discharge: HOME | DRG: 540 | End: 2022-11-01
Payer: COMMERCIAL

## 2022-11-01 ENCOUNTER — ROUTINE PRENATAL (OUTPATIENT)
Dept: OBGYN | Age: 27
End: 2022-11-01
Payer: COMMERCIAL

## 2022-11-01 VITALS — DIASTOLIC BLOOD PRESSURE: 68 MMHG | BODY MASS INDEX: 29.95 KG/M2 | WEIGHT: 180 LBS | SYSTOLIC BLOOD PRESSURE: 112 MMHG

## 2022-11-01 DIAGNOSIS — Z3A.36 36 WEEKS GESTATION OF PREGNANCY: ICD-10-CM

## 2022-11-01 DIAGNOSIS — Z3A.36 36 WEEKS GESTATION OF PREGNANCY: Primary | ICD-10-CM

## 2022-11-01 DIAGNOSIS — Z98.891 HISTORY OF C-SECTION: ICD-10-CM

## 2022-11-01 PROCEDURE — 1036F TOBACCO NON-USER: CPT | Performed by: ADVANCED PRACTICE MIDWIFE

## 2022-11-01 PROCEDURE — G8419 CALC BMI OUT NRM PARAM NOF/U: HCPCS | Performed by: ADVANCED PRACTICE MIDWIFE

## 2022-11-01 PROCEDURE — G8484 FLU IMMUNIZE NO ADMIN: HCPCS | Performed by: ADVANCED PRACTICE MIDWIFE

## 2022-11-01 PROCEDURE — G8427 DOCREV CUR MEDS BY ELIG CLIN: HCPCS | Performed by: ADVANCED PRACTICE MIDWIFE

## 2022-11-01 PROCEDURE — 99213 OFFICE O/P EST LOW 20 MIN: CPT | Performed by: ADVANCED PRACTICE MIDWIFE

## 2022-11-01 PROCEDURE — 87081 CULTURE SCREEN ONLY: CPT

## 2022-11-01 NOTE — PROGRESS NOTES
Claudia Downs is here at 36w4d for:    Chief Complaint   Patient presents with    Routine Prenatal Visit     Pt is here today for routine prenatal visit/GBS. Pt is 39 w and 4 days. Estimated Due Date: Estimated Date of Delivery: 22    OB History    Para Term  AB Living   5 4 3 1   4   SAB IAB Ectopic Molar Multiple Live Births           0 4      # Outcome Date GA Lbr Taz/2nd Weight Sex Delivery Anes PTL Lv   5 Current            4 Term 18 39w1d  7 lb 2.3 oz (3.239 kg) F CS-LTranv Spinal  DAY   3 Term 04/10/15 39w0d  8 lb 4 oz (3.742 kg) M CS-LTranv Spinal N DAY   2 Term 14 39w2d  7 lb 6.6 oz (3.362 kg) M CS-LTranv Spinal N DAY   1  13 36w0d  6 lb 3.8 oz (2.829 kg) M CS-LTranv Spinal N DAY      Complications: Fetal Intolerance, Autism        Past Medical History:   Diagnosis Date    Herpes        Past Surgical History:   Procedure Laterality Date     SECTION, LOW TRANSVERSE  2013     SECTION, LOW TRANSVERSE  2014     SECTION, LOW TRANSVERSE  4/10/2015    [place]  DELIVERY ONLY N/A 2018     SECTION-REPEAT performed by Sharon Mora MD at [facility] AT THE [facility] L&D OR       Social History     Tobacco Use   Smoking Status Former    Packs/day: 0.25    Years: 0.50    Pack years: 0.13    Types: Cigarettes    Quit date: 6/10/2014    Years since quittin.4   Smokeless Tobacco Never        Social History     Substance and Sexual Activity   Alcohol Use No    Alcohol/week: 0.0 standard drinks       No results found for this visit on 22. HPI: Patient here today for OB visit. Patient states this morning she felt a gush but has had nothing leaking since. Yes PT denies fever, chills, nausea and vomiting       Vitals:  Estimated body mass index is 29.95 kg/m² as calculated from the following:    Height as of 22: 5' 5\" (1.651 m). Weight as of this encounter: 180 lb (81.6 kg).   BP: 112/68  Weight: 180 lb (81.6 kg)  Patient Position: Sitting  Albumin: Negative  Glucose: Negative  Fundal Height (cm): 35 cm  Fetal HR: 142  Movement: Present           Abdomen: soft   Results reviewed today:    speculum exam completed  Normal discharge noted  Negative nitrazine  No pooling        ASSESSMENT & Plan    Diagnosis Orders   1. 36 weeks gestation of pregnancy  Culture, Strep B Screen, Vaginal/Rectal      2. History of                   I am having Rodríguez Cooper. Aziza maintain her Prenatal MV-Min-Fe Fum-FA-DHA (PRENATAL 1 PO) and Multiple Vitamins-Minerals (MULTIVITAMIN WOMEN PO). Return for Keep next appt. There are no Patient Instructions on file for this visit.              REYNALDO Rojo CNM,2022 11:10 AM

## 2022-11-03 ENCOUNTER — ANESTHESIA EVENT (OUTPATIENT)
Dept: LABOR AND DELIVERY | Age: 27
DRG: 540 | End: 2022-11-03
Payer: COMMERCIAL

## 2022-11-03 ENCOUNTER — PREP FOR PROCEDURE (OUTPATIENT)
Dept: OBGYN | Age: 27
End: 2022-11-03

## 2022-11-03 ENCOUNTER — HOSPITAL ENCOUNTER (OUTPATIENT)
Age: 27
Discharge: HOME | DRG: 540 | End: 2022-11-03
Attending: OBSTETRICS & GYNECOLOGY | Admitting: OBSTETRICS & GYNECOLOGY
Payer: COMMERCIAL

## 2022-11-03 VITALS — HEIGHT: 66 IN | WEIGHT: 180 LBS | BODY MASS INDEX: 28.93 KG/M2

## 2022-11-03 LAB
ABO/RH: NORMAL
ANTIBODY SCREEN: NEGATIVE
ARM BAND NUMBER: NORMAL
EXPIRATION DATE: NORMAL
HCT VFR BLD CALC: 33.5 % (ref 36.3–47.1)
HEMOGLOBIN: 11.6 G/DL (ref 11.9–15.1)
MCH RBC QN AUTO: 33.1 PG (ref 25.2–33.5)
MCHC RBC AUTO-ENTMCNC: 34.6 G/DL (ref 28.4–34.8)
MCV RBC AUTO: 95.7 FL (ref 82.6–102.9)
NRBC AUTOMATED: 0 PER 100 WBC
PDW BLD-RTO: 12.3 % (ref 11.8–14.4)
PLATELET # BLD: 203 K/UL (ref 138–453)
PMV BLD AUTO: 10 FL (ref 8.1–13.5)
RBC # BLD: 3.5 M/UL (ref 3.95–5.11)
WBC # BLD: 8.1 K/UL (ref 3.5–11.3)

## 2022-11-03 PROCEDURE — 86850 RBC ANTIBODY SCREEN: CPT

## 2022-11-03 PROCEDURE — 86900 BLOOD TYPING SEROLOGIC ABO: CPT

## 2022-11-03 PROCEDURE — 36415 COLL VENOUS BLD VENIPUNCTURE: CPT

## 2022-11-03 PROCEDURE — 85027 COMPLETE CBC AUTOMATED: CPT

## 2022-11-03 PROCEDURE — 86901 BLOOD TYPING SEROLOGIC RH(D): CPT

## 2022-11-03 RX ORDER — TRISODIUM CITRATE DIHYDRATE AND CITRIC ACID MONOHYDRATE 500; 334 MG/5ML; MG/5ML
30 SOLUTION ORAL ONCE
Status: CANCELLED | OUTPATIENT
Start: 2022-11-03 | End: 2022-11-03

## 2022-11-03 RX ORDER — SODIUM CHLORIDE, SODIUM LACTATE, POTASSIUM CHLORIDE, AND CALCIUM CHLORIDE .6; .31; .03; .02 G/100ML; G/100ML; G/100ML; G/100ML
1000 INJECTION, SOLUTION INTRAVENOUS ONCE
Status: CANCELLED | OUTPATIENT
Start: 2022-11-03 | End: 2022-11-03

## 2022-11-03 RX ORDER — SODIUM CHLORIDE 0.9 % (FLUSH) 0.9 %
10 SYRINGE (ML) INJECTION EVERY 12 HOURS SCHEDULED
Status: CANCELLED | OUTPATIENT
Start: 2022-11-03

## 2022-11-03 RX ORDER — SODIUM CHLORIDE, SODIUM LACTATE, POTASSIUM CHLORIDE, CALCIUM CHLORIDE 600; 310; 30; 20 MG/100ML; MG/100ML; MG/100ML; MG/100ML
INJECTION, SOLUTION INTRAVENOUS CONTINUOUS
Status: CANCELLED | OUTPATIENT
Start: 2022-11-03

## 2022-11-03 RX ORDER — FAMOTIDINE 10 MG/ML
20 INJECTION, SOLUTION INTRAVENOUS ONCE
Status: CANCELLED | OUTPATIENT
Start: 2022-11-03 | End: 2022-11-03

## 2022-11-03 RX ORDER — SODIUM CHLORIDE 0.9 % (FLUSH) 0.9 %
10 SYRINGE (ML) INJECTION PRN
Status: CANCELLED | OUTPATIENT
Start: 2022-11-03

## 2022-11-03 RX ORDER — CALCIUM CARBONATE 200(500)MG
2 TABLET,CHEWABLE ORAL 3 TIMES DAILY PRN
COMMUNITY
End: 2022-11-10

## 2022-11-03 RX ORDER — SODIUM CHLORIDE 9 MG/ML
INJECTION, SOLUTION INTRAVENOUS PRN
Status: CANCELLED | OUTPATIENT
Start: 2022-11-03

## 2022-11-03 RX ORDER — METOCLOPRAMIDE HYDROCHLORIDE 5 MG/ML
10 INJECTION INTRAMUSCULAR; INTRAVENOUS ONCE
Status: CANCELLED | OUTPATIENT
Start: 2022-11-03 | End: 2022-11-03

## 2022-11-03 NOTE — H&P
Fabiola Jose is a 32 y.o. female patient. No diagnosis found. Past Medical History:   Diagnosis Date    Herpes      OB History          5    Para   4    Term   3       1    AB        Living   4         SAB        IAB        Ectopic        Molar        Multiple   0    Live Births   4              36w6d  Estimated Date of Delivery: 22  No Known Allergies  Active Problems:    * No active hospital problems. *  Resolved Problems:    * No resolved hospital problems. *    Last menstrual period 2022, not currently breastfeeding. Leticia Sears MD  11/3/2022  Department of Obstetrics and Gynecology   Obstetrics History and Physical  H&P Admission Inpatient  Note        CHIEF COMPLAINT:  No chief complaint on file. Repeat elective  section, for prior sections    HISTORY OF PRESENT ILLNESS:      The patient is a 32 y.o. female at 36w7d. OB History          5    Para   4    Term   3       1    AB        Living   4         SAB        IAB        Ectopic        Molar        Multiple   0    Live Births   4            Patient presents with a chief complaint as above and is being admitted for   without tubal ligation    Estimated Due Date: Estimated Date of Delivery: 22    PRENATAL CARE:    Complicated by: none    PAST OB HISTORY:  OB History          5    Para   4    Term   3       1    AB        Living   4         SAB        IAB        Ectopic        Molar        Multiple   0    Live Births   4                Past Medical History:        Diagnosis Date    Herpes      Past Surgical History:        Procedure Laterality Date     SECTION, LOW TRANSVERSE  2013     SECTION, LOW TRANSVERSE  2014     SECTION, LOW TRANSVERSE  4/10/2015    [place]  DELIVERY ONLY N/A 2018     SECTION-REPEAT performed by Leticia Sears MD at [facility] AT THE [facility] L&D OR     Allergies:  Patient has no known allergies.     Social History:    Social History     Socioeconomic History    Marital status: Single     Spouse name: Not on file    Number of children: Not on file    Years of education: Not on file    Highest education level: Not on file   Occupational History    Not on file   Tobacco Use    Smoking status: Former     Packs/day: 0.25     Years: 0.50     Pack years: 0.13     Types: Cigarettes     Quit date: 6/10/2014     Years since quittin.4    Smokeless tobacco: Never   Vaping Use    Vaping Use: Never used   Substance and Sexual Activity    Alcohol use: No     Alcohol/week: 0.0 standard drinks    Drug use: No    Sexual activity: Yes     Partners: Male   Other Topics Concern    Not on file   Social History Narrative    Not on file     Social Determinants of Health     Financial Resource Strain: Not on file   Food Insecurity: Not on file   Transportation Needs: Not on file   Physical Activity: Not on file   Stress: Not on file   Social Connections: Not on file   Intimate Partner Violence: Not on file   Housing Stability: Not on file     Family History:       Problem Relation Age of Onset    No Known Problems Mother     Asthma Father     High Blood Pressure Father     Diabetes Father     No Known Problems Brother     Depression Maternal Grandmother     No Known Problems Maternal Grandfather     No Known Problems Paternal Grandmother     No Known Problems Paternal Grandfather     Arthritis Paternal Uncle      Medications Prior to Admission:  Not in a hospital admission. REVIEW OF SYSTEMS:    CONSTITUTIONAL:  negative  RESPIRATORY:  negative  CARDIOVASCULAR:  negative  GASTROINTESTINAL:  negative  ALLERGIC/IMMUNOLOGIC:  negative  NEUROLOGICAL:  negative  BEHAVIOR/PSYCH:  negative    PHYSICAL EXAM:  There were no vitals filed for this visit. General appearance:  awake, alert, cooperative, no apparent distress, and appears stated age  Neurologic:  Awake, alert, oriented to name, place and time.     Lungs:  No increased work of breathing, good air exchange  Abdomen:  Soft, non tender, gravid, consistent with her gestational age, EFW by Leopald's manouever was 7 pounds  Fetal heart rate:  Reassuring. Pelvis:  Adequate pelvis  Cervix: Closed 50% medium -3  Contraction frequency:  0 minutes    Membranes:  Intact    Labs:  Blood Type: B POSITIVE    Rubella:    Rubella Antibody, IgG   Date Value Ref Range Status   2022 140.7 IU/mL Final     Comment:                 REFERENCE RANGE:  <5.0       NON-REACTIVE (non-immune)  5.0 TO 9.9 EQUIVOCAL  >=10.0     REACTIVE     (immune)             T. Pallidium, IGG:    T. pallidum, IgG   Date Value Ref Range Status   2022 NONREACTIVE NONREACTIVE Final     Comment:           T. pallidum antibodies are not detected. There is no serological evidence of infection with T. pallidum (early primary syphilis   cannot be excluded). Retest in 2-4 weeks if syphilis is clinically suspect. Sickle Cell Screen: No results found for: SICKLE  Hepatitis B Surface Antigen:   Hepatitis B Surface Ag   Date Value Ref Range Status   2022 NONREACTIVE NONREACTIVE Final     HIV:    Lab Results   Component Value Date    OCBRDPH9K4 NONREACTIVE 2014            No results found for this visit on 22. ASSESSMENT AND PLAN:    Estimated length of stay: 3 days    Active Problems:    * No active hospital problems.  *      Labor: Admit, anticipate normal delivery, routine labor orders  Fetus: Reassuring, Cat 1  GBS: Results still pending  Other:       Valery Walker MD,11/3/2022 9:22 AM

## 2022-11-04 ENCOUNTER — HOSPITAL ENCOUNTER (INPATIENT)
Age: 27
LOS: 2 days | Discharge: HOME | DRG: 540 | End: 2022-11-06
Attending: OBSTETRICS & GYNECOLOGY | Admitting: OBSTETRICS & GYNECOLOGY
Payer: COMMERCIAL

## 2022-11-04 ENCOUNTER — ANESTHESIA (OUTPATIENT)
Dept: LABOR AND DELIVERY | Age: 27
DRG: 540 | End: 2022-11-04
Payer: COMMERCIAL

## 2022-11-04 DIAGNOSIS — Z98.891 STATUS POST REPEAT LOW TRANSVERSE CESAREAN SECTION: Primary | ICD-10-CM

## 2022-11-04 PROBLEM — Q51.9 UTERINE ANOMALY: Status: ACTIVE | Noted: 2022-11-04

## 2022-11-04 PROBLEM — Z3A.37 37 WEEKS GESTATION OF PREGNANCY: Status: ACTIVE | Noted: 2022-11-04

## 2022-11-04 LAB
AMPHETAMINE SCREEN URINE: NEGATIVE
BARBITURATE SCREEN URINE: NEGATIVE
BENZODIAZEPINE SCREEN, URINE: NEGATIVE
BUPRENORPHINE URINE: NEGATIVE
CANNABINOID SCREEN URINE: NEGATIVE
COCAINE METABOLITE, URINE: NEGATIVE
CULTURE: NORMAL
METHADONE SCREEN, URINE: NEGATIVE
METHAMPHETAMINE, URINE: NEGATIVE
OPIATES, URINE: NEGATIVE
OXYCODONE SCREEN URINE: NEGATIVE
PHENCYCLIDINE, URINE: NEGATIVE
PROPOXYPHENE, URINE: NEGATIVE
SPECIMEN DESCRIPTION: NORMAL
TRICYCLIC ANTIDEPRESSANTS, UR: NEGATIVE

## 2022-11-04 PROCEDURE — 3609079900 HC CESAREAN SECTION: Performed by: OBSTETRICS & GYNECOLOGY

## 2022-11-04 PROCEDURE — A4216 STERILE WATER/SALINE, 10 ML: HCPCS | Performed by: OBSTETRICS & GYNECOLOGY

## 2022-11-04 PROCEDURE — A4216 STERILE WATER/SALINE, 10 ML: HCPCS

## 2022-11-04 PROCEDURE — 6360000002 HC RX W HCPCS

## 2022-11-04 PROCEDURE — 2500000003 HC RX 250 WO HCPCS: Performed by: OBSTETRICS & GYNECOLOGY

## 2022-11-04 PROCEDURE — 6360000002 HC RX W HCPCS: Performed by: OBSTETRICS & GYNECOLOGY

## 2022-11-04 PROCEDURE — 59514 CESAREAN DELIVERY ONLY: CPT | Performed by: OBSTETRICS & GYNECOLOGY

## 2022-11-04 PROCEDURE — 2580000003 HC RX 258

## 2022-11-04 PROCEDURE — 59514 CESAREAN DELIVERY ONLY: CPT | Performed by: ADVANCED PRACTICE MIDWIFE

## 2022-11-04 PROCEDURE — 80306 DRUG TEST PRSMV INSTRMNT: CPT

## 2022-11-04 PROCEDURE — 2720000010 HC SURG SUPPLY STERILE: Performed by: OBSTETRICS & GYNECOLOGY

## 2022-11-04 PROCEDURE — 2580000003 HC RX 258: Performed by: OBSTETRICS & GYNECOLOGY

## 2022-11-04 PROCEDURE — 64488 TAP BLOCK BI INJECTION: CPT

## 2022-11-04 PROCEDURE — 7100000001 HC PACU RECOVERY - ADDTL 15 MIN: Performed by: OBSTETRICS & GYNECOLOGY

## 2022-11-04 PROCEDURE — 3700000000 HC ANESTHESIA ATTENDED CARE: Performed by: OBSTETRICS & GYNECOLOGY

## 2022-11-04 PROCEDURE — 3700000001 HC ADD 15 MINUTES (ANESTHESIA): Performed by: OBSTETRICS & GYNECOLOGY

## 2022-11-04 PROCEDURE — 1220000000 HC SEMI PRIVATE OB R&B

## 2022-11-04 PROCEDURE — 2709999900 HC NON-CHARGEABLE SUPPLY: Performed by: OBSTETRICS & GYNECOLOGY

## 2022-11-04 PROCEDURE — 7100000000 HC PACU RECOVERY - FIRST 15 MIN: Performed by: OBSTETRICS & GYNECOLOGY

## 2022-11-04 PROCEDURE — 6360000002 HC RX W HCPCS: Performed by: MIDWIFE

## 2022-11-04 PROCEDURE — 6370000000 HC RX 637 (ALT 250 FOR IP): Performed by: OBSTETRICS & GYNECOLOGY

## 2022-11-04 RX ORDER — SODIUM CHLORIDE, SODIUM LACTATE, POTASSIUM CHLORIDE, AND CALCIUM CHLORIDE .6; .31; .03; .02 G/100ML; G/100ML; G/100ML; G/100ML
1000 INJECTION, SOLUTION INTRAVENOUS ONCE
Status: DISCONTINUED | OUTPATIENT
Start: 2022-11-04 | End: 2022-11-04

## 2022-11-04 RX ORDER — MISOPROSTOL 100 UG/1
800 TABLET ORAL PRN
Status: DISCONTINUED | OUTPATIENT
Start: 2022-11-04 | End: 2022-11-06 | Stop reason: HOSPADM

## 2022-11-04 RX ORDER — ROPIVACAINE HYDROCHLORIDE 5 MG/ML
INJECTION, SOLUTION EPIDURAL; INFILTRATION; PERINEURAL PRN
Status: DISCONTINUED | OUTPATIENT
Start: 2022-11-04 | End: 2022-11-04

## 2022-11-04 RX ORDER — SODIUM CHLORIDE 0.9 % (FLUSH) 0.9 %
5-40 SYRINGE (ML) INJECTION EVERY 12 HOURS SCHEDULED
Status: DISCONTINUED | OUTPATIENT
Start: 2022-11-04 | End: 2022-11-06 | Stop reason: HOSPADM

## 2022-11-04 RX ORDER — SODIUM CHLORIDE, SODIUM LACTATE, POTASSIUM CHLORIDE, CALCIUM CHLORIDE 600; 310; 30; 20 MG/100ML; MG/100ML; MG/100ML; MG/100ML
INJECTION, SOLUTION INTRAVENOUS CONTINUOUS
Status: DISCONTINUED | OUTPATIENT
Start: 2022-11-04 | End: 2022-11-04

## 2022-11-04 RX ORDER — ROPIVACAINE HYDROCHLORIDE 5 MG/ML
INJECTION, SOLUTION EPIDURAL; INFILTRATION; PERINEURAL PRN
Status: DISCONTINUED | OUTPATIENT
Start: 2022-11-04 | End: 2022-11-04 | Stop reason: SDUPTHER

## 2022-11-04 RX ORDER — KETOROLAC TROMETHAMINE 30 MG/ML
30 INJECTION, SOLUTION INTRAMUSCULAR; INTRAVENOUS ONCE
Status: DISCONTINUED | OUTPATIENT
Start: 2022-11-04 | End: 2022-11-04

## 2022-11-04 RX ORDER — NALOXONE HYDROCHLORIDE 0.4 MG/ML
0.4 INJECTION, SOLUTION INTRAMUSCULAR; INTRAVENOUS; SUBCUTANEOUS PRN
Status: DISCONTINUED | OUTPATIENT
Start: 2022-11-04 | End: 2022-11-06 | Stop reason: HOSPADM

## 2022-11-04 RX ORDER — ENOXAPARIN SODIUM 100 MG/ML
40 INJECTION SUBCUTANEOUS DAILY
Status: DISCONTINUED | OUTPATIENT
Start: 2022-11-04 | End: 2022-11-06 | Stop reason: HOSPADM

## 2022-11-04 RX ORDER — KETOROLAC TROMETHAMINE 30 MG/ML
30 INJECTION, SOLUTION INTRAMUSCULAR; INTRAVENOUS EVERY 6 HOURS
Status: COMPLETED | OUTPATIENT
Start: 2022-11-04 | End: 2022-11-05

## 2022-11-04 RX ORDER — SODIUM CHLORIDE 0.9 % (FLUSH) 0.9 %
10 SYRINGE (ML) INJECTION EVERY 12 HOURS SCHEDULED
Status: DISCONTINUED | OUTPATIENT
Start: 2022-11-04 | End: 2022-11-04

## 2022-11-04 RX ORDER — DEXAMETHASONE SODIUM PHOSPHATE 10 MG/ML
INJECTION, SOLUTION INTRAMUSCULAR; INTRAVENOUS PRN
Status: DISCONTINUED | OUTPATIENT
Start: 2022-11-04 | End: 2022-11-04 | Stop reason: SDUPTHER

## 2022-11-04 RX ORDER — PHENYLEPHRINE HYDROCHLORIDE 10 MG/ML
INJECTION INTRAVENOUS PRN
Status: DISCONTINUED | OUTPATIENT
Start: 2022-11-04 | End: 2022-11-04 | Stop reason: SDUPTHER

## 2022-11-04 RX ORDER — KETOROLAC TROMETHAMINE 30 MG/ML
INJECTION, SOLUTION INTRAMUSCULAR; INTRAVENOUS PRN
Status: DISCONTINUED | OUTPATIENT
Start: 2022-11-04 | End: 2022-11-04 | Stop reason: SDUPTHER

## 2022-11-04 RX ORDER — SODIUM CHLORIDE 0.9 % (FLUSH) 0.9 %
10 SYRINGE (ML) INJECTION PRN
Status: DISCONTINUED | OUTPATIENT
Start: 2022-11-04 | End: 2022-11-04

## 2022-11-04 RX ORDER — ONDANSETRON 2 MG/ML
4 INJECTION INTRAMUSCULAR; INTRAVENOUS EVERY 6 HOURS PRN
Status: DISCONTINUED | OUTPATIENT
Start: 2022-11-04 | End: 2022-11-06 | Stop reason: HOSPADM

## 2022-11-04 RX ORDER — OXYCODONE HYDROCHLORIDE 5 MG/1
5 TABLET ORAL EVERY 4 HOURS PRN
Status: DISCONTINUED | OUTPATIENT
Start: 2022-11-04 | End: 2022-11-06 | Stop reason: HOSPADM

## 2022-11-04 RX ORDER — SODIUM CHLORIDE 9 MG/ML
INJECTION, SOLUTION INTRAVENOUS PRN
Status: DISCONTINUED | OUTPATIENT
Start: 2022-11-04 | End: 2022-11-04

## 2022-11-04 RX ORDER — SODIUM CHLORIDE 9 MG/ML
INJECTION, SOLUTION INTRAVENOUS PRN
Status: DISCONTINUED | OUTPATIENT
Start: 2022-11-04 | End: 2022-11-06 | Stop reason: HOSPADM

## 2022-11-04 RX ORDER — SODIUM CHLORIDE 9 MG/ML
INJECTION INTRAVENOUS PRN
Status: DISCONTINUED | OUTPATIENT
Start: 2022-11-04 | End: 2022-11-04 | Stop reason: SDUPTHER

## 2022-11-04 RX ORDER — DOCUSATE SODIUM 100 MG/1
100 CAPSULE, LIQUID FILLED ORAL 2 TIMES DAILY
Status: DISCONTINUED | OUTPATIENT
Start: 2022-11-04 | End: 2022-11-06 | Stop reason: HOSPADM

## 2022-11-04 RX ORDER — KETOROLAC TROMETHAMINE 30 MG/ML
30 INJECTION, SOLUTION INTRAMUSCULAR; INTRAVENOUS EVERY 6 HOURS PRN
Status: DISCONTINUED | OUTPATIENT
Start: 2022-11-04 | End: 2022-11-04

## 2022-11-04 RX ORDER — MODIFIED LANOLIN
OINTMENT (GRAM) TOPICAL
Status: DISCONTINUED | OUTPATIENT
Start: 2022-11-04 | End: 2022-11-06 | Stop reason: HOSPADM

## 2022-11-04 RX ORDER — CARBOPROST TROMETHAMINE 250 UG/ML
250 INJECTION, SOLUTION INTRAMUSCULAR PRN
Status: DISCONTINUED | OUTPATIENT
Start: 2022-11-04 | End: 2022-11-06 | Stop reason: HOSPADM

## 2022-11-04 RX ORDER — SODIUM CHLORIDE 0.9 % (FLUSH) 0.9 %
5-40 SYRINGE (ML) INJECTION PRN
Status: DISCONTINUED | OUTPATIENT
Start: 2022-11-04 | End: 2022-11-06 | Stop reason: HOSPADM

## 2022-11-04 RX ORDER — IBUPROFEN 800 MG/1
800 TABLET ORAL EVERY 8 HOURS PRN
Status: DISCONTINUED | OUTPATIENT
Start: 2022-11-04 | End: 2022-11-06 | Stop reason: HOSPADM

## 2022-11-04 RX ORDER — METOCLOPRAMIDE HYDROCHLORIDE 5 MG/ML
10 INJECTION INTRAMUSCULAR; INTRAVENOUS ONCE
Status: COMPLETED | OUTPATIENT
Start: 2022-11-04 | End: 2022-11-04

## 2022-11-04 RX ORDER — TRISODIUM CITRATE DIHYDRATE AND CITRIC ACID MONOHYDRATE 500; 334 MG/5ML; MG/5ML
30 SOLUTION ORAL ONCE
Status: COMPLETED | OUTPATIENT
Start: 2022-11-04 | End: 2022-11-04

## 2022-11-04 RX ORDER — SODIUM CHLORIDE, SODIUM LACTATE, POTASSIUM CHLORIDE, CALCIUM CHLORIDE 600; 310; 30; 20 MG/100ML; MG/100ML; MG/100ML; MG/100ML
INJECTION, SOLUTION INTRAVENOUS CONTINUOUS
Status: DISCONTINUED | OUTPATIENT
Start: 2022-11-04 | End: 2022-11-06 | Stop reason: HOSPADM

## 2022-11-04 RX ORDER — ONDANSETRON 2 MG/ML
INJECTION INTRAMUSCULAR; INTRAVENOUS PRN
Status: DISCONTINUED | OUTPATIENT
Start: 2022-11-04 | End: 2022-11-04 | Stop reason: SDUPTHER

## 2022-11-04 RX ORDER — OXYCODONE HYDROCHLORIDE 5 MG/1
10 TABLET ORAL EVERY 4 HOURS PRN
Status: DISCONTINUED | OUTPATIENT
Start: 2022-11-04 | End: 2022-11-06 | Stop reason: HOSPADM

## 2022-11-04 RX ORDER — ACETAMINOPHEN 500 MG
1000 TABLET ORAL EVERY 8 HOURS PRN
Status: DISCONTINUED | OUTPATIENT
Start: 2022-11-04 | End: 2022-11-06 | Stop reason: HOSPADM

## 2022-11-04 RX ORDER — METHYLERGONOVINE MALEATE 0.2 MG/ML
200 INJECTION INTRAVENOUS PRN
Status: DISCONTINUED | OUTPATIENT
Start: 2022-11-04 | End: 2022-11-06 | Stop reason: HOSPADM

## 2022-11-04 RX ADMIN — SODIUM CHLORIDE, POTASSIUM CHLORIDE, SODIUM LACTATE AND CALCIUM CHLORIDE: 600; 310; 30; 20 INJECTION, SOLUTION INTRAVENOUS at 22:40

## 2022-11-04 RX ADMIN — Medication 50 ML: at 07:50

## 2022-11-04 RX ADMIN — SODIUM CHLORIDE, POTASSIUM CHLORIDE, SODIUM LACTATE AND CALCIUM CHLORIDE: 600; 310; 30; 20 INJECTION, SOLUTION INTRAVENOUS at 06:55

## 2022-11-04 RX ADMIN — DOCUSATE SODIUM 100 MG: 100 CAPSULE, LIQUID FILLED ORAL at 20:33

## 2022-11-04 RX ADMIN — PHENYLEPHRINE HYDROCHLORIDE 100 MCG: 10 INJECTION INTRAVENOUS at 07:42

## 2022-11-04 RX ADMIN — SODIUM CHLORIDE 5 ML: 9 INJECTION INTRAMUSCULAR; INTRAVENOUS; SUBCUTANEOUS at 07:31

## 2022-11-04 RX ADMIN — CEFOXITIN SODIUM 2000 MG: 2 POWDER, FOR SOLUTION INTRAVENOUS at 07:17

## 2022-11-04 RX ADMIN — PHENYLEPHRINE HYDROCHLORIDE 100 MCG: 10 INJECTION INTRAVENOUS at 07:35

## 2022-11-04 RX ADMIN — Medication 150 ML: at 08:31

## 2022-11-04 RX ADMIN — FAMOTIDINE 20 MG: 10 INJECTION INTRAVENOUS at 06:45

## 2022-11-04 RX ADMIN — KETOROLAC TROMETHAMINE 30 MG: 30 INJECTION, SOLUTION INTRAMUSCULAR at 07:53

## 2022-11-04 RX ADMIN — ONDANSETRON 4 MG: 2 INJECTION INTRAMUSCULAR; INTRAVENOUS at 07:22

## 2022-11-04 RX ADMIN — SODIUM CHLORIDE, SODIUM LACTATE, POTASSIUM CHLORIDE, AND CALCIUM CHLORIDE 1000 ML: .6; .31; .03; .02 INJECTION, SOLUTION INTRAVENOUS at 05:55

## 2022-11-04 RX ADMIN — PHENYLEPHRINE HYDROCHLORIDE 100 MCG: 10 INJECTION INTRAVENOUS at 07:46

## 2022-11-04 RX ADMIN — KETOROLAC TROMETHAMINE 30 MG: 30 INJECTION, SOLUTION INTRAMUSCULAR at 14:45

## 2022-11-04 RX ADMIN — SODIUM CHLORIDE, POTASSIUM CHLORIDE, SODIUM LACTATE AND CALCIUM CHLORIDE: 600; 310; 30; 20 INJECTION, SOLUTION INTRAVENOUS at 05:54

## 2022-11-04 RX ADMIN — PHENYLEPHRINE HYDROCHLORIDE 100 MCG: 10 INJECTION INTRAVENOUS at 07:38

## 2022-11-04 RX ADMIN — SODIUM CHLORIDE, POTASSIUM CHLORIDE, SODIUM LACTATE AND CALCIUM CHLORIDE: 600; 310; 30; 20 INJECTION, SOLUTION INTRAVENOUS at 14:26

## 2022-11-04 RX ADMIN — ROPIVACAINE HYDROCHLORIDE 40 ML: 5 INJECTION EPIDURAL; INFILTRATION; PERINEURAL at 08:28

## 2022-11-04 RX ADMIN — ENOXAPARIN SODIUM 40 MG: 100 INJECTION SUBCUTANEOUS at 20:34

## 2022-11-04 RX ADMIN — DEXAMETHASONE SODIUM PHOSPHATE 10 MG: 10 INJECTION, SOLUTION INTRAMUSCULAR; INTRAVENOUS at 08:28

## 2022-11-04 RX ADMIN — KETOROLAC TROMETHAMINE 30 MG: 30 INJECTION, SOLUTION INTRAMUSCULAR at 20:33

## 2022-11-04 RX ADMIN — SODIUM CITRATE AND CITRIC ACID MONOHYDRATE 30 ML: 500; 334 SOLUTION ORAL at 06:45

## 2022-11-04 RX ADMIN — METOCLOPRAMIDE 10 MG: 5 INJECTION, SOLUTION INTRAMUSCULAR; INTRAVENOUS at 06:45

## 2022-11-04 ASSESSMENT — PAIN SCALES - GENERAL
PAINLEVEL_OUTOF10: 5
PAINLEVEL_OUTOF10: 6
PAINLEVEL_OUTOF10: 6

## 2022-11-04 ASSESSMENT — PAIN DESCRIPTION - LOCATION
LOCATION: ABDOMEN
LOCATION: INCISION

## 2022-11-04 ASSESSMENT — PAIN - FUNCTIONAL ASSESSMENT: PAIN_FUNCTIONAL_ASSESSMENT: ACTIVITIES ARE NOT PREVENTED

## 2022-11-04 ASSESSMENT — PAIN DESCRIPTION - DESCRIPTORS
DESCRIPTORS: DISCOMFORT
DESCRIPTORS: BURNING

## 2022-11-04 NOTE — ANESTHESIA PRE PROCEDURE
Department of Anesthesiology  Preprocedure Note       Name:  Clifton Espinoza   Age:  32 y.o.  :  1995                                          MRN:  [id number]         Date:  2022      Surgeon: Rosy Manriquez): Daya Benoit MD    Procedure: Procedure(s):   SECTION    Medications prior to admission:   Prior to Admission medications    Medication Sig Start Date End Date Taking? Authorizing Provider   calcium carbonate (TUMS) 500 MG chewable tablet Take 2 tablets by mouth 3 times daily as needed for Heartburn    Historical Provider, MD   bismuth subsalicylate (PEPTO BISMOL) 262 MG/15ML suspension Take 15 mLs by mouth every 6 hours as needed for Indigestion    Historical Provider, MD   Multiple Vitamins-Minerals (MULTIVITAMIN WOMEN PO) Take by mouth daily  Patient not taking: No sig reported    Historical Provider, MD   Prenatal MV-Min-Fe Fum-FA-DHA (PRENATAL 1 PO) Take by mouth     Historical Provider, MD       Current medications:    Current Facility-Administered Medications   Medication Dose Route Frequency Provider Last Rate Last Admin    lactated ringers infusion   IntraVENous Continuous Daya Benoit  mL/hr at 22 0655 New Bag at 22 0655    lactated ringers bolus  1,000 mL IntraVENous Once Daya Benoit MD        sodium chloride flush 0.9 % injection 10 mL  10 mL IntraVENous 2 times per day Daya Benoit MD        sodium chloride flush 0.9 % injection 10 mL  10 mL IntraVENous PRN Daya Benoit MD        0.9 % sodium chloride infusion   IntraVENous PRN Daya Benoit MD        cefOXitin (MEFOXIN) 2000 mg in dextrose 5% 50 mL (mini-bag)  2,000 mg IntraVENous On Call to Nikita Fragoso MD        oxytocin (PITOCIN) 30 units in 500 mL infusion  87.3 shyla-units/min IntraVENous Continuous PRN Daya Benoit MD        And    oxytocin (PITOCIN) 10 unit bolus from the bag  10 Units IntraVENous PRN Daya Benoit MD           Allergies:     Allergies Allergen Reactions    Other Itching     Hummus       Problem List:    Patient Active Problem List   Diagnosis Code    S/P repeat low transverse  Z98.891    Dysmenorrhea N94.6    Term pregnancy Z34.90    Status post repeat low transverse  section Z98.891       Past Medical History:        Diagnosis Date    Herpes        Past Surgical History:        Procedure Laterality Date     SECTION, LOW TRANSVERSE  2013     SECTION, LOW TRANSVERSE  2014     SECTION, LOW TRANSVERSE  4/10/2015    [place]  DELIVERY ONLY N/A 2018     SECTION-REPEAT performed by Frances Hall MD at [facility] AT THE [facility] L&D OR       Social History:    Social History     Tobacco Use    Smoking status: Former     Packs/day: 0.25     Years: 0.50     Pack years: 0.13     Types: Cigarettes     Quit date: 6/10/2014     Years since quittin.4    Smokeless tobacco: Never   Substance Use Topics    Alcohol use: No     Alcohol/week: 0.0 standard drinks                                Counseling given: Not Answered      Vital Signs (Current):   Vitals:    22 0538 22 0539   BP:  105/65   Pulse:  (!) 113   Resp:  16   Temp: 36.8 °C (98.2 °F)    TempSrc: Oral                                               BP Readings from Last 3 Encounters:   22 105/65   22 112/68   10/24/22 112/70       NPO Status:                                                                                 BMI:   Wt Readings from Last 3 Encounters:   22 180 lb (81.6 kg)   22 180 lb (81.6 kg)   10/24/22 185 lb (83.9 kg)     There is no height or weight on file to calculate BMI.    CBC:   Lab Results   Component Value Date/Time    WBC 8.1 2022 02:15 PM    RBC 3.50 2022 02:15 PM    HGB 11.6 2022 02:15 PM    HCT 33.5 2022 02:15 PM    MCV 95.7 2022 02:15 PM    RDW 12.3 2022 02:15 PM     2022 02:15 PM       CMP:   Lab Results   Component Value Date/Time    GLUCOSE 96 2022 10:56 AM       POC Tests: No results for input(s): POCGLU, POCNA, POCK, POCCL, POCBUN, POCHEMO, POCHCT in the last 72 hours. Coags: No results found for: PROTIME, INR, APTT    HCG (If Applicable):   Lab Results   Component Value Date    PREGTESTUR positive 2022        ABGs: No results found for: PHART, PO2ART, WFE3PMD, GSR1ZCS, BEART, D4IIJZXP     Type & Screen (If Applicable):  No results found for: LABABO, LABRH    Drug/Infectious Status (If Applicable):  Lab Results   Component Value Date/Time    HEPCAB NONREACTIVE 2022 12:24 PM       COVID-19 Screening (If Applicable): No results found for: COVID19        Anesthesia Evaluation  Patient summary reviewed and Nursing notes reviewed no history of anesthetic complications:   Airway: Mallampati: I  TM distance: >3 FB   Neck ROM: full  Mouth opening: > = 3 FB   Dental: normal exam         Pulmonary:Negative Pulmonary ROS and normal exam                               Cardiovascular:Negative CV ROS             Beta Blocker:  Not on Beta Blocker         Neuro/Psych:   Negative Neuro/Psych ROS              GI/Hepatic/Renal: Neg GI/Hepatic/Renal ROS            Endo/Other: Negative Endo/Other ROS                    Abdominal:              PE comment: 36 week    Vascular: negative vascular ROS. Other Findings:           Anesthesia Plan      spinal     ASA 2           MIPS: Prophylactic antiemetics administered. Anesthetic plan and risks discussed with patient. Use of blood products discussed with patient whom consented to blood products.                      REYNALDO Veliz CRNA   2022

## 2022-11-04 NOTE — LACTATION NOTE
Lactation education:    [x] Latch/ good latch vs shallow latch/ steps to obtaining deep latch    [x] How to know if infant is eating enough/ feedings per 24 hours, wet/dirty diapers    [x] Feeding/satiety cues      Lactation education resources given:     [x]  How to Breastfeed your baby - [address] publication      [x]  Follow up support information    [x]  Breast milk storage guidelines - [facility]    [x]  Breastpump cleaning guidelines - [facility]     [x]  Breastfeeding & Safe Sleep handout - [address] publication    [x]  Calling All Dads! Handout - [address] publication      []  Breast and Nipple Care - [facility]     []  [address]    []  [place]    []  Going Back to Work - [facility]    []  Preventing Engorgement - [facility]    Supplies given:    []  Brush, soap and basin for breastpump cleaning    []  Insurance pump provided     []  [facility] pump set up for patient to use    Explained to patient, patient verbalizes understanding.         Signed:  Jessenia Figueroa RN, BSN, IBCLC

## 2022-11-04 NOTE — L&D DELIVERY NOTE
Mother's Information      Labor Events     Labor?: No  Cervical Ripening:   Now               Dorlalo Ngozi [[id number]]      Labor Events     Labor?: No   Steroids?: None  Cervical Ripening Date/Time:       Rupture Date/Time:     Rupture Type: AROM  Fluid Color: Clear  Fluid Odor: None  Fluid Volume: Moderate       Anesthesia    Method: Spinal       Start Pushing      Labor onset date/time:   Now     Dilation complete date/time:   Now     Start pushing date/time:    Decision date/time (emergent ):           Labor Length    3rd stage: 0h 01m       Delivery ([place])      Delivery Date/Time:  22 07:49:00   Delivery Type: , Low Transverse    Details:  Trial of Labor?: No    Categorization: Repeat    Priority: Scheduled   Indications for : Prior Uterine Surgery   Skin Incision Type: Low Transverse     Uterine Incision: Low Transverse              Presentation    Presentation: Vertex       Shoulder Dystocia    Shoulder Dystocia Present?: No  Add Second Maneuver  Add Third Maneuver  Add Fourth Maneuver  Add Fifth Maneuver  Add Sixth Maneuver  Add Seventh Maneuver  Add Eighth Maneuver  Add Ninth Maneuver       Assisted Delivery Details    Forceps Attempted?: No  Vacuum Extractor Attempted?: No       Document Additional Attempt         Document Additional Attempt                 Cord    Vessels: 3 Vessels  Complications: None  Delayed Cord Clamping?: Yes  Cord Blood Disposition: Lab       Placenta    Date/Time: 2022 07:50:00  Removal: Manual Removal  Appearance: Intact  Disposition: Discarded       Lacerations           Vaginal Counts        Sponges Needles Instruments   Initial Counts      Final Counts      If the count is incorrect due to Intentionally Retained Foreign Object (IRFO) add the IRFO LDA in Lines/Drains.   Add LDA: Link to [facility]       Blood Loss  Mother: Stormy Valdez #[id number]     Start of Mother's Information      Delivery Blood Loss  22 0721 - 22 0749      Quantitative Blood Loss (mL) Hospital Encounter 511 grams    Total  511 mL               End of Mother's Information  Mother: Tabby Mcgrath #[id number]                Delivery Providers    Delivering clinician: Nils Sanchez MD     Provider Role     Obstetrician     Primary Nurse     Primary  Nurse     NICU Nurse     Neonatologist     Anesthesiologist     Nurse Anesthetist     Nurse Practitioner    REYNALDO Forte CNM Midwife     Nursery Nurse    REYNALDO Forte CNM Assistant Surgeon               Assessment    Living Status: Living     Apgar Scoring Key:    0 1 2    Skin Color: Blue or pale Acrocyanotic Completely pink    Heart Rate: Absent <100 bpm >100 bpm    Reflex Irritability: No response Grimace Cry or active withdrawal    Muscle Tone: Limp Some flexion Active motion    Respiratory Effort: Absent Weak cry; hypoventilation Good, crying                      Skin Color:   Heart Rate:   Reflex Irritability:   Muscle Tone:   Respiratory Effort:    Total:            1 Minute:    1    2    2    2    2    9        Apgar 1 total from OB History    5 Minute:    1    2    2    2    2    9        Apgar 5 total from OB History    10 Minute:              15 Minute:              20 Minute:                        Apgars Assigned Bella Cerda RN              Resuscitation    Method: Stimulation              Measurements      Birth Weight: 2981 g Birth Length: 0.483 m     Head Circumference: 0.34 m               Title      Skin to Skin Initiation Date/Time:       Skin to Skin End Date/Time:                      Department of Obstetrics and Gynecology  First Assist C/S op note      The patient is a 32 y.o. female at 37w0d   OB History          5    Para   4    Term   3       1    AB        Living   4         SAB        IAB        Ectopic        Molar        Multiple   0    Live Births   4 Pre-operative Diagnosis:  Principal Problem:    Status post repeat low transverse  section  Active Problems:    37 weeks gestation of pregnancy    Uterine anomaly  Resolved Problems:    * No resolved hospital problems.  *      Post-operative Diagnosis:  Living  infant(s) and Female    Blood Type and Rh: B POSITIVE      Condition:  infant stable and mother stable remain bonding in delivery room      Sub Q closed with a 3-0 Monocryl  Skin closed with 4-0 Monocryl  Steri strips applied   Then Acticote dressing applied

## 2022-11-04 NOTE — PLAN OF CARE
Problem: Vaginal Birth or  Section  Goal: Fetal and maternal status remain reassuring during the birth process  Description:  Birth OB-Pregnancy care plan goal which identifies if the fetal and maternal status remain reassuring during the birth process  2022 0549 by Fabián Quinn RN  Outcome: Completed  2022 by Fabián Quinn RN  Outcome: Progressing

## 2022-11-04 NOTE — FLOWSHEET NOTE
ADMISSION PACKET INCLUDING POSTPARTUM BOOKLET DISCUSSED AND GIVEN TO PATIENT WITH PATIENT VERBALIZING UNDERSTANDING.

## 2022-11-04 NOTE — OP NOTE
[address], [place] [address]                                OPERATIVE REPORT    PATIENT NAME: Bassam Barrett             :        1995  MED REC NO:   [id number]                              ROOM:       9927  ACCOUNT NO:   [de-identified]                           ADMIT DATE: 2022  PROVIDER:     Danielle Swanson MD    DATE OF PROCEDURE:  2022    PREOPERATIVE DIAGNOSES:  Pregnancy at 37 weeks plus, history of four  prior  sections, and history of known large uterine window in  the lower uterine segment. POSTOPERATIVE DIAGNOSES:  Pregnancy at 37 weeks plus, history of four  prior  sections, and history of known large uterine window in  the lower uterine segment plus window noted over the entire lower  uterine segment. PROCEDURE PERFORMED:  Repeat  section, low-transverse uterine  segment. SURGEON:  Danielle Swanson M.D. ANESTHESIA:  Spinal.    SURGICAL ASSISTANT:  Ellen Aldrich. ESTIMATED BLOOD LOSS:  500 mL. COMPLICATIONS OF THE PROCEDURE:  None. FINDINGS:  A viable vigorous female infant in vertex presentation with  clear amniotic fluid and as mentioned the entire lower uterine segment  is a large window. There are also dense adhesions of the uterus to the  anterior abdominal wall. DESCRIPTION OF PROCEDURE:  The patient was taken to the operating room. Spinal anesthesia was administered. Pneumatic stockings were placed. After appropriate prepping, Cardoza catheter is placed. Abdomen was  sterilely prepped and draped in the usual fashion. Scalpel was used to  open the old incision. Bovie cautery was used to take down the  subcutaneous tissue and open this as well as coagulate small bleeding  vessels that were encountered. This was taken to the level of the  fascia and the fascia was also carefully divided.   Then, the fascia was  carefully mobilized away from the rectus muscles both superiorly and  inferiorly. I did attempt to enter the peritoneum, however, there was  dense adhesions between the anterior abdominal wall and the uterus and  so this was carried out until a plane could be found and then exposure  to the lower uterine segment was noted. As mentioned, the entire  segment was a large window. So after there was adequate exposure,  hemostats were used to open the uterus and then with fundal pressure and  's fingers lifting the fetal head, fetus was readily delivered,  noted to be vigorous viable female. Cord blood specimen was obtained  after the cord was clamped and cut and infant handed off to nursing  attending delivery. Placenta was manually extracted from the uterus and  uterus was cleaned of blood clots and membranes and the cervix was  carefully opened. The uterus was left in situ because of the large  amount of adhesions of the uterus to the anterior abdominal wall. The  uterus was closed as well as possible with the significant uterine  window. Good hemostasis was noted along this. Then, there was also the  area of the corpus was also sutured, wherein attempting to find the  plane between the anterior wall and the uterus, and then this was also  sutured with a #1 chromic and this had good hemostasis after completion  as well. Tanvi was used over all of these incisions and over the  rectus muscles as well. Then, the fascia was closed with 0 PDS in a  running non-interlocking fashion. The subcutaneous tissue was well  irrigated. The scar tissue was divided, so the skin edges would lay  smoothly. Juarez Caldera did close the subcutaneous tissue and then the  skin in a subcuticular layer. All sponge, needle, and instrument counts  were correct. The patient will have a block after this procedure and be  taken to recovery room in good condition.     Of note, we will have a significant discussion with the patient about  the safety of any future pregnancies with this large uterine window.         Harper Boswell MD    D: 11/04/2022 8:35:56       T: 11/04/2022 8:41:08     [id number]  Job#: [id number]     Doc#: [id number]    CC:

## 2022-11-04 NOTE — FLOWSHEET NOTE
PATIENT AMBULATES TO ROOM 209 PER SELF FOR A SCHEDULED . ORIENTED PATIENT TO ROOM AND CALL SYSTEM AND SHE VERBALIZED UNDERSTANDING. GOWN GIVEN AND INSTRUCTIONS FOR A CLEAN CATCH URINE. PATIENT TO BATHROOM.

## 2022-11-04 NOTE — PLAN OF CARE
Problem: Postpartum  Goal: Appropriate maternal -  bonding  Description:  Postpartum OB-Pregnancy care plan goal which identifies if the mother and  are bonding appropriately  Outcome: Progressing  Goal: Establishment of infant feeding pattern  Description:  Postpartum OB-Pregnancy care plan goal which identifies if the mother is establishing a feeding pattern with their   Outcome: Progressing     Problem: Pain  Goal: Verbalizes/displays adequate comfort level or baseline comfort level  Outcome: Progressing     Problem: Infection - Adult  Goal: Absence of infection at discharge  Outcome: Progressing  Goal: Absence of infection during hospitalization  Outcome: Progressing     Problem: Discharge Planning  Goal: Discharge to home or other facility with appropriate resources  Outcome: Progressing

## 2022-11-04 NOTE — INTERVAL H&P NOTE
Update History & Physical    The patient's History and Physical of November 3, 2022 was reviewed with the patient and I examined the patient. There was no change. The surgical site was confirmed by the patient and me. Plan: The risks, benefits, expected outcome, and alternative to the recommended procedure have been discussed with the patient. Patient understands and wants to proceed with the procedure.      Electronically signed by Juan Pruitt MD on 11/4/2022 at 7:19 AM

## 2022-11-04 NOTE — ANESTHESIA PROCEDURE NOTES
Peripheral Block    Patient location during procedure: OR  Reason for block: post-op pain management and at surgeon's request  Start time: 11/4/2022 8:23 AM  End time: 11/4/2022 8:28 AM  Staffing  Performed: resident/CRNA   Resident/CRNA: Obinna Huitron, APRN - CRNA  Preanesthetic Checklist  Completed: patient identified, IV checked, site marked, risks and benefits discussed, surgical/procedural consents, equipment checked, pre-op evaluation, timeout performed, anesthesia consent given, oxygen available and monitors applied/VS acknowledged  Peripheral Block   Patient position: supine  Prep: ChloraPrep  Provider prep: mask and sterile gloves  Patient monitoring: continuous pulse ox, IV access, responsive to questions, cardiac monitor and frequent blood pressure checks  Block type: TAP  Laterality: bilateral  Injection technique: single-shot  Guidance: ultrasound guided  Local infiltration: ropivacaine and decadron  Infiltration strength: 0.5 %  Local infiltration: ropivacaine and decadron  Dose: 40 mL    Needle   Needle type: insulated echogenic nerve stimulator needle   Needle gauge: 22 G  Needle localization: ultrasound guidance  Needle length: 8 cm  Assessment   Injection assessment: negative aspiration for heme, no paresthesia on injection, local visualized surrounding nerve on ultrasound and no intravascular symptoms  Paresthesia pain: none  Slow fractionated injection: yes  Hemodynamics: stable  Real-time US image taken/store: yes  Outcomes: uncomplicated and patient tolerated procedure well    Additional Notes  40 mL 0.5% ropivacaine- 10 mg decadron- 20 mL NaCl divided evenly

## 2022-11-04 NOTE — PROGRESS NOTES
Pt arrives for PAT. Pt has her 4 other children accompanying her for appointment. OB PAT Epic tab done. CHG soap given to patient as well as PAT paperwork - all given and explained, pt v.u. Labwork drawn, pt tolerates well.

## 2022-11-05 LAB
ABSOLUTE EOS #: <0.03 K/UL (ref 0–0.44)
ABSOLUTE IMMATURE GRANULOCYTE: 0.08 K/UL (ref 0–0.3)
ABSOLUTE LYMPH #: 2.74 K/UL (ref 1.1–3.7)
ABSOLUTE MONO #: 0.73 K/UL (ref 0.1–1.2)
BASOPHILS # BLD: 0 % (ref 0–2)
BASOPHILS ABSOLUTE: 0.03 K/UL (ref 0–0.2)
EOSINOPHILS RELATIVE PERCENT: 0 % (ref 1–4)
HCT VFR BLD CALC: 28.5 % (ref 36.3–47.1)
HEMOGLOBIN: 9.5 G/DL (ref 11.9–15.1)
IMMATURE GRANULOCYTES: 1 %
LYMPHOCYTES # BLD: 23 % (ref 24–43)
MCH RBC QN AUTO: 33.1 PG (ref 25.2–33.5)
MCHC RBC AUTO-ENTMCNC: 33.3 G/DL (ref 28.4–34.8)
MCV RBC AUTO: 99.3 FL (ref 82.6–102.9)
MONOCYTES # BLD: 6 % (ref 3–12)
NRBC AUTOMATED: 0 PER 100 WBC
PDW BLD-RTO: 12.4 % (ref 11.8–14.4)
PLATELET # BLD: 150 K/UL (ref 138–453)
PMV BLD AUTO: 9.9 FL (ref 8.1–13.5)
RBC # BLD: 2.87 M/UL (ref 3.95–5.11)
SEG NEUTROPHILS: 70 % (ref 36–65)
SEGMENTED NEUTROPHILS ABSOLUTE COUNT: 8.23 K/UL (ref 1.5–8.1)
WBC # BLD: 11.8 K/UL (ref 3.5–11.3)

## 2022-11-05 PROCEDURE — 36415 COLL VENOUS BLD VENIPUNCTURE: CPT

## 2022-11-05 PROCEDURE — 6370000000 HC RX 637 (ALT 250 FOR IP): Performed by: OBSTETRICS & GYNECOLOGY

## 2022-11-05 PROCEDURE — 6360000002 HC RX W HCPCS: Performed by: MIDWIFE

## 2022-11-05 PROCEDURE — 1220000000 HC SEMI PRIVATE OB R&B

## 2022-11-05 PROCEDURE — 85025 COMPLETE CBC W/AUTO DIFF WBC: CPT

## 2022-11-05 PROCEDURE — 6370000000 HC RX 637 (ALT 250 FOR IP): Performed by: MIDWIFE

## 2022-11-05 PROCEDURE — 6360000002 HC RX W HCPCS: Performed by: OBSTETRICS & GYNECOLOGY

## 2022-11-05 RX ADMIN — KETOROLAC TROMETHAMINE 30 MG: 30 INJECTION, SOLUTION INTRAMUSCULAR at 08:21

## 2022-11-05 RX ADMIN — ACETAMINOPHEN 1000 MG: 500 TABLET ORAL at 00:54

## 2022-11-05 RX ADMIN — DOCUSATE SODIUM 100 MG: 100 CAPSULE, LIQUID FILLED ORAL at 08:21

## 2022-11-05 RX ADMIN — ACETAMINOPHEN 1000 MG: 500 TABLET ORAL at 20:29

## 2022-11-05 RX ADMIN — ENOXAPARIN SODIUM 40 MG: 100 INJECTION SUBCUTANEOUS at 20:26

## 2022-11-05 RX ADMIN — KETOROLAC TROMETHAMINE 30 MG: 30 INJECTION, SOLUTION INTRAMUSCULAR at 14:50

## 2022-11-05 RX ADMIN — DOCUSATE SODIUM 100 MG: 100 CAPSULE, LIQUID FILLED ORAL at 20:32

## 2022-11-05 RX ADMIN — ACETAMINOPHEN 1000 MG: 500 TABLET ORAL at 11:32

## 2022-11-05 ASSESSMENT — PAIN DESCRIPTION - DESCRIPTORS
DESCRIPTORS: CRAMPING
DESCRIPTORS: DISCOMFORT
DESCRIPTORS: DULL;ACHING
DESCRIPTORS: ACHING

## 2022-11-05 ASSESSMENT — PAIN DESCRIPTION - LOCATION
LOCATION: HEAD
LOCATION: ABDOMEN
LOCATION: INCISION
LOCATION: HEAD;INCISION

## 2022-11-05 ASSESSMENT — PAIN - FUNCTIONAL ASSESSMENT: PAIN_FUNCTIONAL_ASSESSMENT: ACTIVITIES ARE NOT PREVENTED

## 2022-11-05 ASSESSMENT — PAIN SCALES - GENERAL
PAINLEVEL_OUTOF10: 6
PAINLEVEL_OUTOF10: 2
PAINLEVEL_OUTOF10: 7
PAINLEVEL_OUTOF10: 6

## 2022-11-05 NOTE — PLAN OF CARE
Problem: Postpartum  Goal: Appropriate maternal -  bonding  Description:  Postpartum OB-Pregnancy care plan goal which identifies if the mother and  are bonding appropriately  2022 by Krupa La RN  Outcome: Progressing  2022 100 by Justine Andrews RN  Outcome: Progressing  Goal: Establishment of infant feeding pattern  Description:  Postpartum OB-Pregnancy care plan goal which identifies if the mother is establishing a feeding pattern with their   2022 by Krupa La RN  Outcome: Progressing  2022 100 by Justine Andrews RN  Outcome: Progressing     Problem: Pain  Goal: Verbalizes/displays adequate comfort level or baseline comfort level  2022 by Krupa La RN  Outcome: Progressing  2022 100 by Justine Andrews RN  Outcome: Progressing     Problem: Infection - Adult  Goal: Absence of infection at discharge  2022 by Krupa La RN  Outcome: Progressing  2022 100 by Justine Andrews RN  Outcome: Progressing  Goal: Absence of infection during hospitalization  2022 by Krupa La RN  Outcome: Progressing  2022 100 by Justine Andrews RN  Outcome: Progressing     Problem: Discharge Planning  Goal: Discharge to home or other facility with appropriate resources  2022 by Krupa La RN  Outcome: Progressing  2022 100 by Justine Andrews RN  Outcome: Progressing

## 2022-11-05 NOTE — PLAN OF CARE
Problem: Postpartum  Goal: Appropriate maternal -  bonding  Description:  Postpartum OB-Pregnancy care plan goal which identifies if the mother and  are bonding appropriately  2022 by Carroll Velasquez RN  Outcome: Progressing  2022 by Will Garza RN  Outcome: Progressing  Goal: Establishment of infant feeding pattern  Description:  Postpartum OB-Pregnancy care plan goal which identifies if the mother is establishing a feeding pattern with their   2022 by Carroll Velasquez RN  Outcome: Progressing  2022 by Will Garza RN  Outcome: Progressing     Problem: Pain  Goal: Verbalizes/displays adequate comfort level or baseline comfort level  2022 by Carroll Velasquez RN  Outcome: Progressing  2022 by Will Garza RN  Outcome: Progressing     Problem: Infection - Adult  Goal: Absence of infection at discharge  2022 by Carroll Velasquez RN  Outcome: Progressing  2022 by Will Garza RN  Outcome: Progressing  Goal: Absence of infection during hospitalization  2022 by Carroll Velasquez RN  Outcome: Progressing  2022 by Will Garza RN  Outcome: Progressing     Problem: Discharge Planning  Goal: Discharge to home or other facility with appropriate resources  2022 by Carroll Velasquez RN  Outcome: Progressing  2022 by Will Garza RN  Outcome: Progressing

## 2022-11-05 NOTE — PROGRESS NOTES
C/S  Labor and Delivery                                                                                     Post Partum Progress Note             Flatus:+  Bm: no  Diet: Tolerating regular diet   Ambulation: Ambulates  Questionable spinal headache. Better when lays flat.  Anesthesia to follow    OBJECTIVE:      Vitals:  VSS    ABDOMEN:  NT  INCISION: C/D NSI  GENITAL/URINARY:  normal  Cor: RRR no Murmurs  Pulmonary: clear to auscultation anterior and posterior  Extremities: no Clubbing cyanosis or ecchymosis    DATA:      ASSESSMENT:         S/P C/S post op day # 1     PLAN:  F/U in am

## 2022-11-05 NOTE — ANESTHESIA POSTPROCEDURE EVALUATION
Department of Anesthesiology  Postprocedure Note    Patient: Ligia Carlos  MRN: [id number]  YOB: 1995  Date of evaluation: 2022      Procedure Summary     Date: 22 Room / Location: [facility]    Anesthesia Start: 1413 Anesthesia Stop: 840    Procedure:  SECTION (Abdomen) Diagnosis:       Delivered by  delivery following previous  delivery      (37w. ANATOLY 22. History of large uterine window with previous pregnancy. Jhoana LE to first assist.)    Surgeons: Remonia Mohs, MD Responsible Provider: Tonye Crigler, APRN - CRNA    Anesthesia Type: spinal ASA Status: 2          Anesthesia Type: No value filed.     Marge Phase I: Marge Score: 9    Marge Phase II: Marge Score: 10      Anesthesia Post Evaluation    Patient location during evaluation: bedside  Patient participation: complete - patient participated  Level of consciousness: awake and alert  Pain score: 0  Airway patency: patent  Nausea & Vomiting: no nausea and no vomiting  Complications: no  Cardiovascular status: hemodynamically stable  Respiratory status: acceptable  Hydration status: stable

## 2022-11-06 VITALS
SYSTOLIC BLOOD PRESSURE: 118 MMHG | HEART RATE: 62 BPM | DIASTOLIC BLOOD PRESSURE: 77 MMHG | TEMPERATURE: 97.7 F | OXYGEN SATURATION: 99 % | RESPIRATION RATE: 16 BRPM

## 2022-11-06 PROBLEM — Z34.90 TERM PREGNANCY: Status: RESOLVED | Noted: 2018-11-19 | Resolved: 2022-11-06

## 2022-11-06 PROBLEM — Z3A.37 37 WEEKS GESTATION OF PREGNANCY: Status: RESOLVED | Noted: 2022-11-04 | Resolved: 2022-11-06

## 2022-11-06 PROCEDURE — 6370000000 HC RX 637 (ALT 250 FOR IP): Performed by: OBSTETRICS & GYNECOLOGY

## 2022-11-06 PROCEDURE — 6370000000 HC RX 637 (ALT 250 FOR IP): Performed by: MIDWIFE

## 2022-11-06 PROCEDURE — 99024 POSTOP FOLLOW-UP VISIT: CPT | Performed by: OBSTETRICS & GYNECOLOGY

## 2022-11-06 RX ORDER — OXYCODONE HYDROCHLORIDE 5 MG/1
5 TABLET ORAL EVERY 4 HOURS PRN
Qty: 20 TABLET | Refills: 0 | Status: SHIPPED | OUTPATIENT
Start: 2022-11-06 | End: 2022-11-11

## 2022-11-06 RX ADMIN — ACETAMINOPHEN 1000 MG: 500 TABLET ORAL at 05:23

## 2022-11-06 RX ADMIN — DOCUSATE SODIUM 100 MG: 100 CAPSULE, LIQUID FILLED ORAL at 07:43

## 2022-11-06 RX ADMIN — IBUPROFEN 800 MG: 800 TABLET, FILM COATED ORAL at 02:16

## 2022-11-06 RX ADMIN — OXYCODONE 5 MG: 5 TABLET ORAL at 07:43

## 2022-11-06 ASSESSMENT — PAIN DESCRIPTION - DESCRIPTORS
DESCRIPTORS: ACHING;CRAMPING
DESCRIPTORS: SORE
DESCRIPTORS: DISCOMFORT;THROBBING;DULL

## 2022-11-06 ASSESSMENT — PAIN SCALES - GENERAL
PAINLEVEL_OUTOF10: 4
PAINLEVEL_OUTOF10: 5
PAINLEVEL_OUTOF10: 8

## 2022-11-06 ASSESSMENT — PAIN DESCRIPTION - LOCATION
LOCATION: HEAD;INCISION
LOCATION: INCISION;HEAD
LOCATION: INCISION

## 2022-11-06 ASSESSMENT — PAIN DESCRIPTION - ORIENTATION: ORIENTATION: LOWER;MID

## 2022-11-06 ASSESSMENT — PAIN - FUNCTIONAL ASSESSMENT: PAIN_FUNCTIONAL_ASSESSMENT: ACTIVITIES ARE NOT PREVENTED

## 2022-11-06 NOTE — PLAN OF CARE
Problem: Postpartum  Goal: Appropriate maternal -  bonding  Description:  Postpartum OB-Pregnancy care plan goal which identifies if the mother and  are bonding appropriately  2022 by Madhavi Miller RN  Outcome: Progressing     Problem: Postpartum  Goal: Establishment of infant feeding pattern  Description:  Postpartum OB-Pregnancy care plan goal which identifies if the mother is establishing a feeding pattern with their   2022 08 by Madhavi Miller RN  Outcome: Progressing     Problem: Pain  Goal: Verbalizes/displays adequate comfort level or baseline comfort level  2022 by Madhavi Miller RN  Outcome: Progressing     Problem: Infection - Adult  Goal: Absence of infection at discharge  2022 08 by Madhavi Miller RN  Outcome: Progressing     Problem: Infection - Adult  Goal: Absence of infection during hospitalization  2022 by Madhavi Miller RN  Outcome: Progressing     Problem: Discharge Planning  Goal: Discharge to home or other facility with appropriate resources  2022 by Madhavi Miller RN  Outcome: Progressing

## 2022-11-06 NOTE — DISCHARGE INSTRUCTIONS
Follow-up with your [facility] doctor as specified. [address] Department phone: [phone]    Dr. Kyree Resendiz MD  [address] [facility] [address]   [place] [phone]   or Ashleigh Fuchs [phone]     DIET  Eat a well balanced diet focusing on foods high in fiber and protein. Drink plenty of fluids especially water. To avoid constipation you may take a mild stool softener as recommended by your doctor or midwife. ACTIVITY  Gradually increase your activity. Resume exercise regimen only after advice by your doctor or midwife. Avoid lifting anything heavier than a gallon of milk for SIX weeks. Avoid driving until your doctor or midwife has given their approval.  Leroy Katie slowly from a lying to sitting and then a standing position. Climb stairs one at a time. Use caution when carrying your baby up and down the stairs. NO SEXUAL Activity for 4-6 weeks or until advised by your doctor; Nothing in vagina: intercourse, tampons, or douching. Be prepared to discuss family planning at your follow-up OB visit. You may feel tired or have a lack of energy. You may continue your prenatal vitamin to replenish nutrients post delivery. Nap when baby naps to catch up on sleep. EMOTIONS  You may feel ramirez, sad, teary, & overwhelmed. Contact your OB provider if you feel you may be showing signs of postpartum depression, or have thoughts of harming yourself or your infant. If infant will not stop crying, contact another adult for help or place infant in their crib on their back and take a break. NEVER shake your infant. BLEEDING  Vaginal bleeding will decrease in amount over the next few weeks. You will notice that as your activity increases, your flow may increase. This is your body's way of telling you, you need to take things easier and rest more often.   Call your care provider if you are saturating more than one maxi pad in an hour & resting does not help. BREAST CARE  Take medications as recommended by your doctor or midwife for pain  If you develop a warm, red, tender area on your breast or develop a fever contact your OB provider. For breastfeeding moms:  If you become engorged, feeding may be more difficult or painful for 1-2 days. You may find it helpful to hand express some milk so that the infant can latch on more easily. While breastfeeding, continue to take your prenatal vitamins as directed by your doctor or midwife. Refer to the breastfeeding booklet in the  folder/binder for more information. If you feel you need more assistance or have questions, please call Jonh Guerrier, lactation consultant, at [phone] or the OB department to schedule an appointment or phone consultation. For more FREE breastfeeding help, visit the Breastfeeding Support Group on Monday evenings at 7 pm in the [facility] department. INCISIONAL CARE / MARIANO CARE    If your dressing starts to peel up or becomes soiled prior to your appointment with your provider, you may remove the dressing and clean your incision as directed below. Clean your incision in the shower with mild soap. After shower pat the incision area dry and allow the area open to air. If used, Steri-strips should be completely removed by 2 weeks but you may remove them as they become loose or soiled. If used, Staples should be removed by your care provider. If used/ordered, an abdominal binder may provide support for your incision. Use the mariano-bottle after toileting until bleeding stops. Cleanse your perineum from front to back  If used, stitches will dissolve in 4-6 weeks. You may use a sitz bath or soak in a clean tub as needed for comfort. Kegel exercises will help restore bladder control. SWELLING  Try to keep your legs elevated when you are sitting. When lying down keep your legs elevated.   When wearing stocking or socks, make sure they are not too tight.    WHEN TO CALL THE DOCTOR  If you have a temp of 100.6 or more. If your bleeding has increased and you are saturating a pad in an hour. Your abdomen is tender to touch. You are passing blood clots bigger than the size of a lemon. If you are experiencing extreme weakness or dizziness. If you are having flu-like symptoms such as achy muscles or joints. There is a foul smell or a green color to your vaginal bleeding. If you have pain that cannot be relieved. You have persistent burning or frequency with urination. Call if you have concerns about your well-being. You are unable to sleep, eat, or are having thoughts of harming yourself or your baby. You have swelling, bleeding, drainage, foul odor, redness, or warmth in/around your incision or stitches. You have a red, warm, tender area in your calf.

## 2022-11-06 NOTE — PLAN OF CARE
Problem: Postpartum  Goal: Appropriate maternal -  bonding  Description:  Postpartum OB-Pregnancy care plan goal which identifies if the mother and  are bonding appropriately  2022 by Tin Sparks RN  Outcome: Progressing  2022 by Tin Sparks RN  Outcome: Progressing  2022 by Parish Brooks RN  Outcome: Progressing  Goal: Establishment of infant feeding pattern  Description:  Postpartum OB-Pregnancy care plan goal which identifies if the mother is establishing a feeding pattern with their   2022 by Tin Sparks RN  Outcome: Progressing  2022 by Tin Sparks RN  Outcome: Progressing  2022 by Parish Brooks RN  Outcome: Progressing     Problem: Pain  Goal: Verbalizes/displays adequate comfort level or baseline comfort level  2022 by Tin Sparks RN  Outcome: Progressing  2022 by Tin Sparks RN  Outcome: Progressing  2022 by Parish Brooks RN  Outcome: Progressing     Problem: Infection - Adult  Goal: Absence of infection at discharge  2022 by Tin Sparks RN  Outcome: Progressing  2022 by Tin Sparks RN  Outcome: Progressing  2022 by Parish Brooks RN  Outcome: Progressing  Goal: Absence of infection during hospitalization  2022 by Tin Sparks RN  Outcome: Progressing  2022 by Tin Saprks RN  Outcome: Progressing  2022 09 by Parish Brooks RN  Outcome: Progressing     Problem: Discharge Planning  Goal: Discharge to home or other facility with appropriate resources  2022 by Tin Sparks RN  Outcome: Progressing  2022 by Tin Sparks RN  Outcome: Progressing  2022 by Parish Brooks RN  Outcome: Progressing

## 2022-11-07 NOTE — DISCHARGE SUMMARY
[address]                [address] [place], [address] [facility]                               DISCHARGE SUMMARY    PATIENT NAME: Latoya Glover             :        1995  MED REC NO:   [id number]                              ROOM:       0203  ACCOUNT NO:   [de-identified]                           ADMIT DATE: 2022  PROVIDER:     Shanda Ro MD                [address] [facility] DATE: 2022    FINAL DIAGNOSIS:  This patient delivered at 37 weeks. SECONDARY DIAGNOSES:  Large uterine window and prior  sections  x4. SURGICAL PROCEDURES:  Repeat  section, low-transverse uterine  segment. HISTORY AND HOSPITAL COURSE:  Please see the H and P for complete  details, but that of a 58-year-old; now  5, para 5, all five  prior sections, who had her repeat  section on . This was  done early because there was a large uterine window noted in her last  delivery. At the time of this delivery, the window was even larger and  the entire lower uterine segment was a window was well. The surgery  itself otherwise was uncomplicated and the patient's postoperative  recovery was uneventful. LABORATORY STUDIES:  Her postoperative H and H was 9.5 and 28.5 with a  white count of 11.8. Her preoperative H and H was 11.6 and 33.5 with a  white count of 8.1. CONDITION UPON DISCHARGE:  The patient is discharged to home in good  condition. Her dressing is dry. She is ambulatory. There were no  operative or perioperative complications. DISCHARGE INSTRUCTIONS:  She is to follow her general diet. The patient  was instructed to avoid any heavy lifting, to minimize stair climbing  for a few days, no driving for two weeks, and to minimize trips in the  car and to arrange a follow up incision check in the office.   Of  significance, I did talk to her about the large uterine window she has  and it being dangerous to consider further pregnancy.         Bettina Huston MD    D: 11/06/2022 10:34:12       T: 11/06/2022 10:38:30     [email or web address]  Job#: [id number]     Doc#: [id number]    CC:

## 2022-11-10 ENCOUNTER — POSTPARTUM VISIT (OUTPATIENT)
Dept: OBGYN | Age: 27
End: 2022-11-10

## 2022-11-10 VITALS
DIASTOLIC BLOOD PRESSURE: 68 MMHG | WEIGHT: 175 LBS | SYSTOLIC BLOOD PRESSURE: 112 MMHG | HEIGHT: 66 IN | BODY MASS INDEX: 28.12 KG/M2

## 2022-11-10 DIAGNOSIS — Z48.89 ENCOUNTER FOR POSTOPERATIVE WOUND CHECK: Primary | ICD-10-CM

## 2022-11-10 PROCEDURE — 1036F TOBACCO NON-USER: CPT | Performed by: OBSTETRICS & GYNECOLOGY

## 2022-11-10 PROCEDURE — 99024 POSTOP FOLLOW-UP VISIT: CPT | Performed by: OBSTETRICS & GYNECOLOGY

## 2022-11-10 PROCEDURE — G8427 DOCREV CUR MEDS BY ELIG CLIN: HCPCS | Performed by: OBSTETRICS & GYNECOLOGY

## 2022-11-10 PROCEDURE — G8484 FLU IMMUNIZE NO ADMIN: HCPCS | Performed by: OBSTETRICS & GYNECOLOGY

## 2022-11-10 PROCEDURE — G8419 CALC BMI OUT NRM PARAM NOF/U: HCPCS | Performed by: OBSTETRICS & GYNECOLOGY

## 2022-11-10 PROCEDURE — 1111F DSCHRG MED/CURRENT MED MERGE: CPT | Performed by: OBSTETRICS & GYNECOLOGY

## 2022-11-10 NOTE — PROGRESS NOTES
POSTPARTUM EXAM    Date of service: 11/10/2022    Bhakti Velasquez  Is a 32 y.o. single female    PT's PCP is: Rossi Vazquez MD     : 1995     OB History    Para Term  AB Living   5 5 4 1   5   SAB IAB Ectopic Molar Multiple Live Births           0 5      # Outcome Date GA Lbr Taz/2nd Weight Sex Delivery Anes PTL Lv   5 Term 22 37w0d  6 lb 9.2 oz (2.981 kg) F CS-LTranv Spinal N DAY   4 Term 18 39w1d  7 lb 2.3 oz (3.239 kg) F CS-LTranv Spinal  DAY   3 Term 04/10/15 39w0d  8 lb 4 oz (3.742 kg) M CS-LTranv Spinal N DAY   2 Term 14 39w2d  7 lb 6.6 oz (3.362 kg) M CS-LTranv Spinal N DAY   1  13 36w0d  6 lb 3.8 oz (2.829 kg) M CS-LTranv Spinal N DAY      Complications: Fetal Intolerance, Autism        Social History     Tobacco Use   Smoking Status Former    Packs/day: 0.25    Years: 0.50    Pack years: 0.13    Types: Cigarettes    Quit date: 6/10/2014    Years since quittin.4   Smokeless Tobacco Never         Social History     Substance and Sexual Activity   Alcohol Use No    Alcohol/week: 0.0 standard drinks         Delivery date 22    Type of delivery:  Repeat  section    Laceration:Yes    Infant gender: female    Infant name: Seth Stanford    Are you breast or bottle feeding? Breast    Have you been sexually active since delivery? No    Vital Signs: Blood pressure 112/68, height 5' 6\" (1.676 m), weight 175 lb (79.4 kg), last menstrual period 2022, currently breastfeeding. Labs:    Blood Type and Rh: B POSITIVE          Social History     Tobacco Use   Smoking Status Former    Packs/day: 0.25    Years: 0.50    Pack years: 0.13    Types: Cigarettes    Quit date: 6/10/2014    Years since quittin.4   Smokeless Tobacco Never        Social History     Substance and Sexual Activity   Alcohol Use No    Alcohol/week: 0.0 standard drinks       Allergies:  Other      Current Outpatient Medications:     oxyCODONE (ROXICODONE) 5 MG immediate release tablet, Take 1 tablet by mouth every 4 hours as needed for Pain (postop) for up to 5 days. , Disp: 20 tablet, Rfl: 0    Prenatal MV-Min-Fe Fum-FA-DHA (PRENATAL 1 PO), Take by mouth , Disp: , Rfl:     Last Yearly:  22    Last pap: 22    Last HPV: never    Chief Complaint   Patient presents with    Postpartum Care     Pt is here for routine postpartum care. Pt delivered via c section 22. How many Hours of sleep do you get a night: aprox 6    Do you have a normal appetite: yes    Any problems or pain: no    Do you feel like you coping well: yes    Is baby sleeping:yes    How is baby eating: yes    How did first pediatrician visit go: went well    EPPDS:1    No results found for this visit on 11/10/22. Nurse: LMD    PE:  Vital Signs  Blood pressure 112/68, height 5' 6\" (1.676 m), weight 175 lb (79.4 kg), last menstrual period 2022, currently breastfeeding. Labs:    No results found for this visit on 11/10/22. NURSE: Mia Lopez    HPI: The patient is here today for a postop wound check following her  section. She is doing well is able to void having bowel movements and no unusual incisional pain    Yes  PT denies fever, chills, nausea and vomiting       Objective: The incision is clean and dry and healing well                           Assessment and Plan: Routine care follow-up for 6-week visit          Diagnosis Orders   1. Encounter for postoperative wound check                  No follow-ups on file. FF: 10 minutes    There are no Patient Instructions on file for this visit. Over 50%of time spent on counseling and care coordination on: see assessment and plan,  She was also counseled on her preventative health maintenance recommendations and follow-up.       Kwame Arita MD,11/10/2022 1:41 PM

## 2022-12-14 ENCOUNTER — HOSPITAL ENCOUNTER (OUTPATIENT)
Age: 27
Setting detail: SPECIMEN
Discharge: HOME | End: 2022-12-14
Payer: COMMERCIAL

## 2022-12-14 ENCOUNTER — POSTPARTUM VISIT (OUTPATIENT)
Dept: OBGYN | Age: 27
End: 2022-12-14

## 2022-12-14 VITALS
DIASTOLIC BLOOD PRESSURE: 64 MMHG | BODY MASS INDEX: 26.2 KG/M2 | SYSTOLIC BLOOD PRESSURE: 118 MMHG | WEIGHT: 163 LBS | HEIGHT: 66 IN

## 2022-12-14 LAB
HEMOGLOBIN: 12 G/DL (ref 11.9–15.1)
TSH SERPL DL<=0.05 MIU/L-ACNC: 0.96 UIU/ML (ref 0.3–5)

## 2022-12-14 PROCEDURE — 85018 HEMOGLOBIN: CPT

## 2022-12-14 PROCEDURE — 84443 ASSAY THYROID STIM HORMONE: CPT

## 2022-12-14 NOTE — PROGRESS NOTES
POSTPARTUM EXAM    Date of service: 2022    Olga Yan  Is a 32 y.o. single female    : 1995     OB History    Para Term  AB Living   5 5 4 1   5   SAB IAB Ectopic Molar Multiple Live Births           0 5      # Outcome Date GA Lbr Taz/2nd Weight Sex Delivery Anes PTL Lv   5 Term 22 37w0d  6 lb 9.2 oz (2.981 kg) F CS-LTranv Spinal N DAY   4 Term 18 39w1d  7 lb 2.3 oz (3.239 kg) F CS-LTranv Spinal  DAY   3 Term 04/10/15 39w0d  8 lb 4 oz (3.742 kg) M CS-LTranv Spinal N DAY   2 Term 14 39w2d  7 lb 6.6 oz (3.362 kg) M CS-LTranv Spinal N DAY   1  13 36w0d  6 lb 3.8 oz (2.829 kg) M CS-LTranv Spinal N DAY      Complications: Fetal Intolerance, Autism        Social History     Tobacco Use   Smoking Status Former    Packs/day: 0.25    Years: 0.50    Pack years: 0.13    Types: Cigarettes    Quit date: 6/10/2014    Years since quittin.5   Smokeless Tobacco Never         Social History     Substance and Sexual Activity   Alcohol Use No    Alcohol/week: 0.0 standard drinks         Delivery date 22    Type of delivery:  Repeat  section    Laceration:Yes    Infant gender: female    Infant name: Olivia Sinks    Are you breast or bottle feeding? Breast    Have you been sexually active since delivery? No    History of thyroid issues? No  If yes order TSH with reflux    History of gestational diabetes? No  If yes order 2 hr GT    Vital Signs: Blood pressure 118/64, height 5' 6\" (1.676 m), weight 163 lb (73.9 kg), last menstrual period 2022, currently breastfeeding. Allergies: Other      Current Outpatient Medications:     Multiple Vitamin (DAILY VITAMIN PO), Take by mouth, Disp: , Rfl:     sertraline (ZOLOFT) 50 MG tablet, Take 1 tablet by mouth daily, Disp: 30 tablet, Rfl: 2        Chief Complaint   Patient presents with    Postpartum Care     Pt is here today for 6 week post partum care. Pt delivered via repeat c section 22.  Pt does not feel as if she has been doing well. How many Hours of sleep do you get a night: aprox 5 hours    Do you have a normal appetite: yes    Any problems or pain: no    Do you feel like you coping well: no    Is baby sleeping:yes    How is baby eating: yes    How did first pediatrician visit go: weight was still down but is going up. EPPDS: 15  Postpartum Depression Scale 2022   I have been able to laugh and see the funny side of things As much as I always could   I have looked forward with enjoyment to things As much as I ever did   I have blamed myself unnecessarily when things went wrong Yes, most of the time   I have been anxious or worried for no good reason Yes, very often   I have felt scared or panicky for no good reason Yes, sometimes   I haven't been able to cope lately Yes, sometimes I haven't been coping as well as usual   I have been so unhappy that I have had difficulty sleeping Not very often   I have felt sad or miserable Yes, quite often   I have been so unhappy that I have been crying Yes, quite often   The thought of harming myself has occurred to me Never   Total 15           No results found for this visit on 22. Nurse: LMD    HPI:  PT presents for Post partum exam Follow up in 6 week(s) after delivery. Patient tearful today states she just has a lot going on at home and the current boyfriend is not helping her emotionally very much. She states he does live with her but he is not really there all the time. Patient states the baby is having some bowel issues and she has been going to the pediatrician's office to help with this but they cannot seem to get it under control. Patient states the other children are adjusting well  Patient denies any suicidal or homicidal ideations                              Assessment and Plan          Diagnosis Orders   1. Postpartum care following  delivery  Hemoglobin      2.  Postpartum depression  TSH                I am having Serena Hoyos Aziza start on sertraline. I am also having her maintain her Multiple Vitamin (DAILY VITAMIN PO). Return in about 4 weeks (around 1/11/2023) for med check. She was also counseled on her preventative health maintenance recommendations and follow-up. There are no Patient Instructions on file for this visit.     REYNALDO Newman CNM,12/14/2022 11:33 AM

## 2024-01-09 ENCOUNTER — OFFICE VISIT (OUTPATIENT)
Dept: OBGYN | Age: 29
End: 2024-01-09
Payer: COMMERCIAL

## 2024-01-09 VITALS
SYSTOLIC BLOOD PRESSURE: 116 MMHG | HEIGHT: 66 IN | DIASTOLIC BLOOD PRESSURE: 62 MMHG | BODY MASS INDEX: 25.55 KG/M2 | WEIGHT: 159 LBS

## 2024-01-09 DIAGNOSIS — N92.6 IRREGULAR MENSTRUAL CYCLE: Primary | ICD-10-CM

## 2024-01-09 PROCEDURE — 99213 OFFICE O/P EST LOW 20 MIN: CPT | Performed by: ADVANCED PRACTICE MIDWIFE

## 2024-01-09 PROCEDURE — G8484 FLU IMMUNIZE NO ADMIN: HCPCS | Performed by: ADVANCED PRACTICE MIDWIFE

## 2024-01-09 PROCEDURE — 1036F TOBACCO NON-USER: CPT | Performed by: ADVANCED PRACTICE MIDWIFE

## 2024-01-09 PROCEDURE — G8427 DOCREV CUR MEDS BY ELIG CLIN: HCPCS | Performed by: ADVANCED PRACTICE MIDWIFE

## 2024-01-09 PROCEDURE — G8419 CALC BMI OUT NRM PARAM NOF/U: HCPCS | Performed by: ADVANCED PRACTICE MIDWIFE

## 2024-01-09 NOTE — PROGRESS NOTES
PROBLEM VISIT     Date of service: 2024    Kaitlin Ervin  Is a 28 y.o. single, female    PT's PCP is: Myron Collier MD     : 1995                                             Subjective:       Patient's last menstrual period was 2023 (approximate).     OB History    Para Term  AB Living   5 5 4 1   5   SAB IAB Ectopic Molar Multiple Live Births           0 5      # Outcome Date GA Lbr Taz/2nd Weight Sex Delivery Anes PTL Lv   5 Term 22 37w0d  2.981 kg (6 lb 9.2 oz) F CS-LTranv Spinal N DAY   4 Term 18 39w1d  3.239 kg (7 lb 2.3 oz) F CS-LTranv Spinal  DAY   3 Term 04/10/15 39w0d  3.742 kg (8 lb 4 oz) M CS-LTranv Spinal N DAY   2 Term 14 39w2d  3.362 kg (7 lb 6.6 oz) M CS-LTranv Spinal N DAY   1  13 36w0d  2.829 kg (6 lb 3.8 oz) M CS-LTranv Spinal N DAY      Complications: Fetal Intolerance, Autism        Social History     Tobacco Use   Smoking Status Former    Current packs/day: 0.00    Average packs/day: 0.3 packs/day for 0.5 years (0.1 ttl pk-yrs)    Types: Cigarettes    Start date: 12/10/2013    Quit date: 6/10/2014    Years since quittin.5   Smokeless Tobacco Never        Social History     Substance and Sexual Activity   Alcohol Use No    Alcohol/week: 0.0 standard drinks of alcohol       Social History     Substance and Sexual Activity   Sexual Activity Not Currently    Partners: Male       Allergies: Other    Chief Complaint   Patient presents with    Irregular Menses     Pt is here today to discuss cycle control due to irregular cycles. Pt states cycle comes once a month but the timing varies. Pt would like to discuss depo.       Last Yearly date:  22    Last pap date and results: 22(-)    Last HPV date and results: never    Have you had a positive covid test: No    Have you had the covid immunization: No    PE:  Vital Signs  Blood pressure 116/62, height 1.676 m (5' 6\"), weight 72.1 kg (159 lb), last menstrual period

## 2024-01-15 ENCOUNTER — NURSE ONLY (OUTPATIENT)
Dept: OBGYN | Age: 29
End: 2024-01-15
Payer: COMMERCIAL

## 2024-01-15 DIAGNOSIS — N92.6 IRREGULAR MENSTRUAL CYCLE: Primary | ICD-10-CM

## 2024-01-15 PROCEDURE — 99999 PR OFFICE/OUTPT VISIT,PROCEDURE ONLY: CPT | Performed by: ADVANCED PRACTICE MIDWIFE

## 2024-01-15 PROCEDURE — 96372 THER/PROPH/DIAG INJ SC/IM: CPT | Performed by: ADVANCED PRACTICE MIDWIFE

## 2024-01-15 RX ORDER — MEDROXYPROGESTERONE ACETATE 150 MG/ML
150 INJECTION, SUSPENSION INTRAMUSCULAR ONCE
Status: COMPLETED | OUTPATIENT
Start: 2024-01-15 | End: 2024-01-15

## 2024-01-15 RX ADMIN — MEDROXYPROGESTERONE ACETATE 150 MG: 150 INJECTION, SUSPENSION INTRAMUSCULAR at 12:17

## 2024-01-15 NOTE — PROGRESS NOTES
Nurse visit Injection    Date of service: 1/15/2024    Kaitlin Ervin  Is a 28 y.o. single female    PT's PCP is: Myron Collier MD     : 1995                                             Subjective:       Patient's last menstrual period was 2023 (approximate).     Allergies: Other    Chief Complaint   Patient presents with    Injections     Depo given Right Delt, Office Supplied       Last Yearly date:  22    Last pap date and results: 22    Last HPV date and results: Never  ( if due for pap schedule pap)    LAST DEPO: On cycle now ( if past 13 weeks and not on period please talk with provider)      PE:  Vital Signs  Last menstrual period 2023, not currently breastfeeding.     Labs:    No results found for this visit on 01/15/24.      Yes  PT denies fever, chills, nausea and vomiting                            Assessment and Plan          Diagnosis Orders   1. Irregular menstrual cycle  medroxyPROGESTERone (DEPO-PROVERA) injection 150 mg            injection was: Office supplied      NURSE: Stewart KAUR

## 2024-04-10 ENCOUNTER — NURSE ONLY (OUTPATIENT)
Dept: OBGYN | Age: 29
End: 2024-04-10
Payer: COMMERCIAL

## 2024-04-10 DIAGNOSIS — N92.6 IRREGULAR MENSTRUAL CYCLE: Primary | ICD-10-CM

## 2024-04-10 PROCEDURE — 96372 THER/PROPH/DIAG INJ SC/IM: CPT | Performed by: ADVANCED PRACTICE MIDWIFE

## 2024-04-10 RX ORDER — MEDROXYPROGESTERONE ACETATE 150 MG/ML
150 INJECTION, SUSPENSION INTRAMUSCULAR ONCE
Status: COMPLETED | OUTPATIENT
Start: 2024-04-10 | End: 2024-04-10

## 2024-04-10 RX ADMIN — MEDROXYPROGESTERONE ACETATE 150 MG: 150 INJECTION, SUSPENSION INTRAMUSCULAR at 16:38

## 2024-04-10 NOTE — PROGRESS NOTES
Nurse visit Injection    Date of service: 4/10/2024    Kaitlin Ervin  Is a 28 y.o. single female    PT's PCP is: Myron Collier MD     : 1995                                             Subjective:       No LMP recorded. (Menstrual status: Irregular periods).     Allergies: Other    Chief Complaint   Patient presents with    Injections     Depo given Left Delt, Office supplied       Last Yearly date:  22    Last pap date and results: 22    Last HPV date and results: Never  ( if due for pap schedule pap)    LAST DEPO: 1/15/24( if past 13 weeks and not on period please talk with provider)      PE:  Vital Signs  not currently breastfeeding.     Labs:    No results found for this visit on 04/10/24.      Yes  PT denies fever, chills, nausea and vomiting                            Assessment and Plan          Diagnosis Orders   1. Irregular menstrual cycle  medroxyPROGESTERone (DEPO-PROVERA) injection 150 mg            injection was: Office supplied      NURSE: Stewart acevedo

## 2024-08-06 ENCOUNTER — TELEPHONE (OUTPATIENT)
Dept: OBGYN | Age: 29
End: 2024-08-06

## 2024-08-06 DIAGNOSIS — N92.6 IRREGULAR MENSTRUAL CYCLE: Primary | ICD-10-CM

## 2024-08-06 NOTE — TELEPHONE ENCOUNTER
Pt called to scheduled depo she had cancelled last depo and is out of her 12 week window.  HCG ordered. Once resulted can get scheduled for depo next day.

## 2024-08-08 ENCOUNTER — HOSPITAL ENCOUNTER (OUTPATIENT)
Age: 29
Discharge: HOME | End: 2024-08-08
Payer: COMMERCIAL

## 2024-08-08 DIAGNOSIS — N92.6 IRREGULAR MENSTRUAL CYCLE: ICD-10-CM

## 2024-08-08 LAB — B-HCG SERPL EIA 3RD IS-ACNC: <1 MIU/ML

## 2024-08-08 PROCEDURE — 84702 CHORIONIC GONADOTROPIN TEST: CPT

## 2024-08-08 PROCEDURE — 36415 COLL VENOUS BLD VENIPUNCTURE: CPT

## 2024-08-09 ENCOUNTER — OFFICE VISIT (OUTPATIENT)
Dept: OBGYN | Age: 29
End: 2024-08-09
Payer: COMMERCIAL

## 2024-08-09 VITALS
SYSTOLIC BLOOD PRESSURE: 120 MMHG | DIASTOLIC BLOOD PRESSURE: 68 MMHG | BODY MASS INDEX: 26.52 KG/M2 | HEIGHT: 66 IN | WEIGHT: 165 LBS

## 2024-08-09 DIAGNOSIS — N92.6 IRREGULAR MENSTRUAL CYCLE: Primary | ICD-10-CM

## 2024-08-09 PROCEDURE — 96372 THER/PROPH/DIAG INJ SC/IM: CPT | Performed by: OBSTETRICS & GYNECOLOGY

## 2024-08-09 RX ORDER — MEDROXYPROGESTERONE ACETATE 150 MG/ML
150 INJECTION, SUSPENSION INTRAMUSCULAR ONCE
Status: COMPLETED | OUTPATIENT
Start: 2024-08-09 | End: 2024-08-09

## 2024-08-09 RX ADMIN — MEDROXYPROGESTERONE ACETATE 150 MG: 150 INJECTION, SUSPENSION INTRAMUSCULAR at 10:35

## 2024-08-09 NOTE — PROGRESS NOTES
Nurse visit Injection    Date of service: 2024    Kaitlin Ervin  Is a 29 y.o. single female    PT's PCP is: Myron Collier MD     : 1995                                             Subjective:       No LMP recorded. (Menstrual status: Irregular periods).     Allergies: Other    Chief Complaint   Patient presents with    Injections     Pt is here today for depo injection. HCG preformed yesterday 24(-). Ok per KP to give injection today. Depo was given in right delt.       Last Yearly date:  22(ppv)    Last pap date : Date of last Cervical Cancer screen (HPV or PAP): 2022     results: (-)    Last HPV date and results: never    LAST DEPO:HCG 24(-)  ( if past 13 weeks and not on period please talk with provider)      PE:  Vital Signs  Blood pressure 120/68, height 1.676 m (5' 6\"), weight 74.8 kg (165 lb), not currently breastfeeding.     Labs:    No results found for this visit on 24.      Yes  PT denies fever, chills, nausea and vomiting                            Assessment and Plan          Diagnosis Orders   1. Irregular menstrual cycle  medroxyPROGESTERone (DEPO-PROVERA) injection 150 mg            injection was: Office supplied      NURSE: Nelson KAUR

## 2024-11-11 ENCOUNTER — NURSE ONLY (OUTPATIENT)
Dept: OBGYN | Age: 29
End: 2024-11-11
Payer: COMMERCIAL

## 2024-11-11 DIAGNOSIS — N92.6 IRREGULAR MENSTRUAL CYCLE: Primary | ICD-10-CM

## 2024-11-11 PROCEDURE — 96372 THER/PROPH/DIAG INJ SC/IM: CPT | Performed by: ADVANCED PRACTICE MIDWIFE

## 2024-11-11 RX ORDER — MEDROXYPROGESTERONE ACETATE 150 MG/ML
150 INJECTION, SUSPENSION INTRAMUSCULAR ONCE
Status: COMPLETED | OUTPATIENT
Start: 2024-11-11 | End: 2024-11-11

## 2024-11-11 RX ADMIN — MEDROXYPROGESTERONE ACETATE 150 MG: 150 INJECTION, SUSPENSION INTRAMUSCULAR at 13:06

## 2024-11-11 NOTE — PROGRESS NOTES
Nurse visit Injection    Date of service: 2024    Kaitlin Ervin  Is a 29 y.o. single female    PT's PCP is: Myron Collier MD     : 1995                                             Subjective:       No LMP recorded. (Menstrual status: Irregular periods).     Allergies: Other    Chief Complaint   Patient presents with    Injections     Depo given in the Left Delt, Office Supplied       Last Yearly date:  24    Last pap date : Date of last Cervical Cancer screen (HPV or PAP): 2022     results: Negative    Last HPV date and results: Never    LAST DEPO: 24 ( if past 13 weeks and not on period please talk with provider)      PE:  Vital Signs  not currently breastfeeding.     Labs:    No results found for this visit on 24.      Yes  PT denies fever, chills, nausea and vomiting                            Assessment and Plan          Diagnosis Orders   1. Irregular menstrual cycle  medroxyPROGESTERone (DEPO-PROVERA) injection 150 mg            injection was: Office supplied      NURSE: Stewart KAUR

## 2024-11-11 NOTE — PROGRESS NOTES
Nurse visit Injection    Date of service: 2024    Kaitlin Ervin  Is a 29 y.o. {Desc; marital status:62} female    PT's PCP is: Myron Collier MD     : 1995                                             Subjective:       No LMP recorded. (Menstrual status: Irregular periods).     Allergies: Other    No chief complaint on file.      Last Yearly date:  ***    Last pap date : Date of last Cervical Cancer screen (HPV or PAP): 2022     results: ***    Last HPV date and results: ***    LAST DEPO: *** ( if past 13 weeks and not on period please talk with provider)      PE:  Vital Signs  not currently breastfeeding.     Labs:    No results found for this visit on 24.      {YES / NO:07628}  PT denies fever, chills, nausea and vomiting                            Assessment and Plan          Diagnosis Orders   1. Irregular menstrual cycle              injection was: {Blank single:57276::\"Office supplied\",\"patient supplied\"}      NURSE: ***

## 2025-01-29 ENCOUNTER — OFFICE VISIT (OUTPATIENT)
Dept: OBGYN | Age: 30
End: 2025-01-29
Payer: COMMERCIAL

## 2025-01-29 VITALS
WEIGHT: 159 LBS | HEIGHT: 65 IN | SYSTOLIC BLOOD PRESSURE: 102 MMHG | BODY MASS INDEX: 26.49 KG/M2 | DIASTOLIC BLOOD PRESSURE: 62 MMHG

## 2025-01-29 DIAGNOSIS — N92.1 BREAKTHROUGH BLEEDING ON DEPO PROVERA: Primary | ICD-10-CM

## 2025-01-29 PROCEDURE — 99213 OFFICE O/P EST LOW 20 MIN: CPT | Performed by: ADVANCED PRACTICE MIDWIFE

## 2025-01-29 PROCEDURE — G8427 DOCREV CUR MEDS BY ELIG CLIN: HCPCS | Performed by: ADVANCED PRACTICE MIDWIFE

## 2025-01-29 PROCEDURE — 1036F TOBACCO NON-USER: CPT | Performed by: ADVANCED PRACTICE MIDWIFE

## 2025-01-29 PROCEDURE — G8419 CALC BMI OUT NRM PARAM NOF/U: HCPCS | Performed by: ADVANCED PRACTICE MIDWIFE

## 2025-01-29 RX ORDER — MEDROXYPROGESTERONE ACETATE 150 MG/ML
150 INJECTION, SUSPENSION INTRAMUSCULAR
COMMUNITY

## 2025-01-29 ASSESSMENT — PATIENT HEALTH QUESTIONNAIRE - PHQ9
1. LITTLE INTEREST OR PLEASURE IN DOING THINGS: NOT AT ALL
SUM OF ALL RESPONSES TO PHQ QUESTIONS 1-9: 0
SUM OF ALL RESPONSES TO PHQ9 QUESTIONS 1 & 2: 0
SUM OF ALL RESPONSES TO PHQ QUESTIONS 1-9: 0
2. FEELING DOWN, DEPRESSED OR HOPELESS: NOT AT ALL
SUM OF ALL RESPONSES TO PHQ QUESTIONS 1-9: 0
SUM OF ALL RESPONSES TO PHQ QUESTIONS 1-9: 0

## 2025-01-29 NOTE — PROGRESS NOTES
PROBLEM VISIT     Date of service: 2025    Kaitlin Ervin  Is a 29 y.o. co-habitating, female    PT's PCP is: Myron Collier MD     : 1995                                             Subjective:       Patient's last menstrual period was 2025 (approximate).     OB History    Para Term  AB Living   5 5 4 1   5   SAB IAB Ectopic Molar Multiple Live Births           0 5      # Outcome Date GA Lbr Taz/2nd Weight Sex Type Anes PTL Lv   5 Term 22 37w0d  2.981 kg (6 lb 9.2 oz) F CS-LTranv Spinal N DAY   4 Term 18 39w1d  3.239 kg (7 lb 2.3 oz) F CS-LTranv Spinal  DAY   3 Term 04/10/15 39w0d  3.742 kg (8 lb 4 oz) M CS-LTranv Spinal N DAY   2 Term 14 39w2d  3.362 kg (7 lb 6.6 oz) M CS-LTranv Spinal N DAY   1  13 36w0d  2.829 kg (6 lb 3.8 oz) M CS-LTranv Spinal N DAY      Complications: Fetal Intolerance, Autism        Social History     Tobacco Use   Smoking Status Former    Current packs/day: 0.00    Average packs/day: 0.3 packs/day for 0.5 years (0.1 ttl pk-yrs)    Types: Cigarettes    Start date: 12/10/2013    Quit date: 6/10/2014    Years since quitting: 10.6   Smokeless Tobacco Never        Social History     Substance and Sexual Activity   Alcohol Use Not Currently       Social History     Substance and Sexual Activity   Sexual Activity Yes    Partners: Male    Comment: depo       Allergies: Other    Chief Complaint   Patient presents with    Irregular Menses     Pt is currently on depo but is having periods that are lasting longer between shots about 3 weeks long. Pt would like to discuss other options. Pt declines std testing.        Last Yearly date:      Last pap date and results: 22 neg     Last HPV date and results: pt states never     PE:  Vital Signs  Blood pressure 102/62, height 1.651 m (5' 5\"), weight 72.1 kg (159 lb), last menstrual period 2025, not currently breastfeeding.  Estimated body mass index is 26.46 kg/m² as

## 2025-02-03 ENCOUNTER — TELEPHONE (OUTPATIENT)
Dept: OBGYN | Age: 30
End: 2025-02-03

## 2025-02-03 ENCOUNTER — OFFICE VISIT (OUTPATIENT)
Dept: OBGYN | Age: 30
End: 2025-02-03
Payer: COMMERCIAL

## 2025-02-03 ENCOUNTER — HOSPITAL ENCOUNTER (OUTPATIENT)
Age: 30
Discharge: HOME | End: 2025-02-03
Payer: COMMERCIAL

## 2025-02-03 VITALS
WEIGHT: 160 LBS | DIASTOLIC BLOOD PRESSURE: 62 MMHG | HEIGHT: 65 IN | BODY MASS INDEX: 26.66 KG/M2 | SYSTOLIC BLOOD PRESSURE: 120 MMHG

## 2025-02-03 DIAGNOSIS — N83.292 COMPLEX CYST OF LEFT OVARY: Primary | ICD-10-CM

## 2025-02-03 DIAGNOSIS — Z01.818 PRE-OP TESTING: ICD-10-CM

## 2025-02-03 DIAGNOSIS — N83.292 COMPLEX CYST OF LEFT OVARY: ICD-10-CM

## 2025-02-03 PROCEDURE — 86304 IMMUNOASSAY TUMOR CA 125: CPT

## 2025-02-03 PROCEDURE — 82378 CARCINOEMBRYONIC ANTIGEN: CPT

## 2025-02-03 PROCEDURE — G8419 CALC BMI OUT NRM PARAM NOF/U: HCPCS | Performed by: OBSTETRICS & GYNECOLOGY

## 2025-02-03 PROCEDURE — 36415 COLL VENOUS BLD VENIPUNCTURE: CPT

## 2025-02-03 PROCEDURE — G8427 DOCREV CUR MEDS BY ELIG CLIN: HCPCS | Performed by: OBSTETRICS & GYNECOLOGY

## 2025-02-03 PROCEDURE — 1036F TOBACCO NON-USER: CPT | Performed by: OBSTETRICS & GYNECOLOGY

## 2025-02-03 PROCEDURE — 99213 OFFICE O/P EST LOW 20 MIN: CPT | Performed by: OBSTETRICS & GYNECOLOGY

## 2025-02-03 SDOH — ECONOMIC STABILITY: FOOD INSECURITY: WITHIN THE PAST 12 MONTHS, YOU WORRIED THAT YOUR FOOD WOULD RUN OUT BEFORE YOU GOT MONEY TO BUY MORE.: NEVER TRUE

## 2025-02-03 SDOH — ECONOMIC STABILITY: FOOD INSECURITY: WITHIN THE PAST 12 MONTHS, THE FOOD YOU BOUGHT JUST DIDN'T LAST AND YOU DIDN'T HAVE MONEY TO GET MORE.: NEVER TRUE

## 2025-02-03 NOTE — PROGRESS NOTES
PROBLEM VISIT     Date of service: 2/3/2025    Kaitlin Ervin  Is a 29 y.o. single female    PT's PCP is: Myron Collier MD     : 1995                                             Subjective:       Patient's last menstrual period was 2025 (approximate).     OB History    Para Term  AB Living   5 5 4 1   5   SAB IAB Ectopic Molar Multiple Live Births           0 5      # Outcome Date GA Lbr Taz/2nd Weight Sex Type Anes PTL Lv   5 Term 22 37w0d  2.981 kg (6 lb 9.2 oz) F CS-LTranv Spinal N DAY   4 Term 18 39w1d  3.239 kg (7 lb 2.3 oz) F CS-LTranv Spinal  DAY   3 Term 04/10/15 39w0d  3.742 kg (8 lb 4 oz) M CS-LTranv Spinal N DAY   2 Term 14 39w2d  3.362 kg (7 lb 6.6 oz) M CS-LTranv Spinal N DAY   1  13 36w0d  2.829 kg (6 lb 3.8 oz) M CS-LTranv Spinal N DAY      Complications: Fetal Intolerance, Autism        Social History     Tobacco Use   Smoking Status Former    Current packs/day: 0.00    Average packs/day: 0.3 packs/day for 0.5 years (0.1 ttl pk-yrs)    Types: Cigarettes    Start date: 12/10/2013    Quit date: 6/10/2014    Years since quitting: 10.6   Smokeless Tobacco Never        Social History     Substance and Sexual Activity   Alcohol Use Not Currently       Social History     Substance and Sexual Activity   Sexual Activity Yes    Partners: Male    Comment: depo       Allergies: Other    Chief Complaint   Patient presents with    Irregular Menses     Pt is here today to review in house gyn usn results from today. Previous pap was 22(-)       Last Yearly:  22 ppv     Last pap: 22(-)    Last HPV: never      NURSE: Jero KAUR    PE:  Vital Signs  Blood pressure 120/62, height 1.651 m (5' 5\"), weight 72.6 kg (160 lb), last menstrual period 2025, not currently breastfeeding.     Labs:    No results found for this visit on 25.    NURSE: Cheri    HPI: The patient is here today with complaints of refractory bleeding

## 2025-02-04 ENCOUNTER — TELEPHONE (OUTPATIENT)
Dept: OBGYN | Age: 30
End: 2025-02-04

## 2025-02-04 LAB
CANCER AG125 SERPL-ACNC: 16 U/ML (ref 0–38)
CEA SERPL-MCNC: 1.5 NG/ML (ref 0–3.8)

## 2025-02-20 ENCOUNTER — TELEPHONE (OUTPATIENT)
Dept: OBGYN | Age: 30
End: 2025-02-20

## 2025-02-20 NOTE — TELEPHONE ENCOUNTER
Venecia from prior auth dept called stating [facility] would like additional information by 2 pm today for surgery. Information requested :  How does bleeding affect ADL's  Pelvic exam  Prior treatments  CBC result.   Spoke to Dr Narvaez we will try to bring pt in today to perform pelvic exam and have cbc and document according to the above specifications, I did lvm for pt expressing the urge to call back asap as there are time restrictions insurance wants these results/documentation by 2 pm.     Venecia JACKSON dept [phone] ext.[phone]

## 2025-02-20 NOTE — TELEPHONE ENCOUNTER
Lvm for david small with [facility] dept to inform her we were unable to contact pt and unable to contact [facility] to inquire if surgery should be cancelled or not since it was not necessarily a denial but a submission for additional info.

## 2025-02-21 ENCOUNTER — TELEPHONE (OUTPATIENT)
Dept: OBGYN | Age: 30
End: 2025-02-21

## 2025-02-21 NOTE — TELEPHONE ENCOUNTER
Spoke with pt informed her of the insurance situation. Will inform her if we receive an approval or denial.

## 2025-02-24 ENCOUNTER — TELEPHONE (OUTPATIENT)
Dept: OBGYN | Age: 30
End: 2025-02-24

## 2025-02-24 NOTE — TELEPHONE ENCOUNTER
Venecia from [place] dept did call she did receive a letter this surgery has been declined she will scan denial into media.

## 2025-02-24 NOTE — TELEPHONE ENCOUNTER
Please contact this pt to get rescheduled with Dr Bourne for surgery Yadira there is a denial scanned into media requesting a recent pelvic exam, CBC which was ordered for pre op however the insurance would like completed first and a few other things. If you need assistance or any information from us please reach out pt is expecting to schedule with Dr Bourne now for the things insurance needs and the surgery. Thank you Yadira

## 2025-03-12 DIAGNOSIS — N92.1 MENORRHAGIA WITH IRREGULAR CYCLE: ICD-10-CM

## 2025-03-25 ENCOUNTER — OFFICE VISIT (OUTPATIENT)
Dept: OBGYN | Age: 30
End: 2025-03-25
Payer: COMMERCIAL

## 2025-03-25 ENCOUNTER — RESULTS FOLLOW-UP (OUTPATIENT)
Dept: OBGYN | Age: 30
End: 2025-03-25

## 2025-03-25 ENCOUNTER — HOSPITAL ENCOUNTER (OUTPATIENT)
Age: 30
Discharge: HOME | End: 2025-03-25
Payer: COMMERCIAL

## 2025-03-25 VITALS
BODY MASS INDEX: 26.66 KG/M2 | WEIGHT: 160 LBS | SYSTOLIC BLOOD PRESSURE: 120 MMHG | HEIGHT: 65 IN | DIASTOLIC BLOOD PRESSURE: 62 MMHG

## 2025-03-25 DIAGNOSIS — N92.6 IRREGULAR MENSTRUAL CYCLE: Primary | ICD-10-CM

## 2025-03-25 DIAGNOSIS — N92.1 MENORRHAGIA WITH IRREGULAR CYCLE: ICD-10-CM

## 2025-03-25 DIAGNOSIS — N92.6 IRREGULAR MENSTRUAL CYCLE: ICD-10-CM

## 2025-03-25 DIAGNOSIS — N83.292 COMPLEX CYST OF LEFT OVARY: ICD-10-CM

## 2025-03-25 LAB
BASOPHILS # BLD: 0.12 K/UL (ref 0–0.2)
BASOPHILS NFR BLD: 2 % (ref 0–2)
EOSINOPHIL # BLD: 0.18 K/UL (ref 0–0.44)
EOSINOPHILS RELATIVE PERCENT: 3 % (ref 1–4)
ERYTHROCYTE [DISTWIDTH] IN BLOOD BY AUTOMATED COUNT: 12.7 % (ref 11.8–14.4)
HCT VFR BLD AUTO: 36.3 % (ref 36.3–47.1)
HGB BLD-MCNC: 11.6 G/DL (ref 11.9–15.1)
IMM GRANULOCYTES # BLD AUTO: <0.03 K/UL (ref 0–0.3)
IMM GRANULOCYTES NFR BLD: 0 %
LYMPHOCYTES NFR BLD: 2.31 K/UL (ref 1.1–3.7)
LYMPHOCYTES RELATIVE PERCENT: 42 % (ref 24–43)
MCH RBC QN AUTO: 30.1 PG (ref 25.2–33.5)
MCHC RBC AUTO-ENTMCNC: 32 G/DL (ref 28.4–34.8)
MCV RBC AUTO: 94 FL (ref 82.6–102.9)
MONOCYTES NFR BLD: 0.33 K/UL (ref 0.1–1.2)
MONOCYTES NFR BLD: 6 % (ref 3–12)
NEUTROPHILS NFR BLD: 47 % (ref 36–65)
NEUTS SEG NFR BLD: 2.59 K/UL (ref 1.5–8.1)
NRBC BLD-RTO: 0 PER 100 WBC
PLATELET # BLD AUTO: 252 K/UL (ref 138–453)
PMV BLD AUTO: 9.9 FL (ref 8.1–13.5)
RBC # BLD AUTO: 3.86 M/UL (ref 3.95–5.11)
TSH SERPL DL<=0.05 MIU/L-ACNC: 1.2 UIU/ML (ref 0.27–4.2)
WBC OTHER # BLD: 5.5 K/UL (ref 3.5–11.3)

## 2025-03-25 PROCEDURE — G8419 CALC BMI OUT NRM PARAM NOF/U: HCPCS | Performed by: OBSTETRICS & GYNECOLOGY

## 2025-03-25 PROCEDURE — 85025 COMPLETE CBC W/AUTO DIFF WBC: CPT

## 2025-03-25 PROCEDURE — 84443 ASSAY THYROID STIM HORMONE: CPT

## 2025-03-25 PROCEDURE — 99213 OFFICE O/P EST LOW 20 MIN: CPT | Performed by: OBSTETRICS & GYNECOLOGY

## 2025-03-25 PROCEDURE — G8427 DOCREV CUR MEDS BY ELIG CLIN: HCPCS | Performed by: OBSTETRICS & GYNECOLOGY

## 2025-03-25 PROCEDURE — 1036F TOBACCO NON-USER: CPT | Performed by: OBSTETRICS & GYNECOLOGY

## 2025-03-25 PROCEDURE — 36415 COLL VENOUS BLD VENIPUNCTURE: CPT

## 2025-03-25 NOTE — PROGRESS NOTES
Diagnosis Orders   1. Irregular menstrual cycle  TSH reflex to FT4    CBC with Auto Differential      2. Complex cyst of left ovary        3. Menorrhagia with irregular cycle             The patient had her preventative health maintenance recommendations and follow-up reviewed with her at the completion of her visit.      ASSESSMENT:      1. Irregular menstrual cycle    2. Complex cyst of left ovary    3. Menorrhagia with irregular cycle        PLAN:  Orders Placed This Encounter   Procedures    TSH reflex to FT4    CBC with Auto Differential     Return in about 2 weeks (around 4/8/2025) for follow up.       Electronically signed by Manda Rosenberg DO on 04/08/25

## 2025-04-08 ENCOUNTER — HOSPITAL ENCOUNTER (OUTPATIENT)
Age: 30
Setting detail: SPECIMEN
Discharge: HOME | End: 2025-04-08
Payer: COMMERCIAL

## 2025-04-08 ENCOUNTER — OFFICE VISIT (OUTPATIENT)
Dept: OBGYN | Age: 30
End: 2025-04-08
Payer: COMMERCIAL

## 2025-04-08 VITALS
SYSTOLIC BLOOD PRESSURE: 110 MMHG | DIASTOLIC BLOOD PRESSURE: 64 MMHG | HEIGHT: 65 IN | WEIGHT: 156 LBS | BODY MASS INDEX: 25.99 KG/M2

## 2025-04-08 DIAGNOSIS — N92.1 MENORRHAGIA WITH IRREGULAR CYCLE: ICD-10-CM

## 2025-04-08 DIAGNOSIS — N92.6 IRREGULAR MENSTRUAL CYCLE: Primary | ICD-10-CM

## 2025-04-08 DIAGNOSIS — N83.292 COMPLEX CYST OF LEFT OVARY: ICD-10-CM

## 2025-04-08 PROCEDURE — 87624 HPV HI-RISK TYP POOLED RSLT: CPT

## 2025-04-08 PROCEDURE — 99213 OFFICE O/P EST LOW 20 MIN: CPT | Performed by: OBSTETRICS & GYNECOLOGY

## 2025-04-08 PROCEDURE — G8419 CALC BMI OUT NRM PARAM NOF/U: HCPCS | Performed by: OBSTETRICS & GYNECOLOGY

## 2025-04-08 PROCEDURE — 1036F TOBACCO NON-USER: CPT | Performed by: OBSTETRICS & GYNECOLOGY

## 2025-04-08 PROCEDURE — 88175 CYTOPATH C/V AUTO FLUID REDO: CPT

## 2025-04-08 PROCEDURE — G8427 DOCREV CUR MEDS BY ELIG CLIN: HCPCS | Performed by: OBSTETRICS & GYNECOLOGY

## 2025-04-08 NOTE — H&P (VIEW-ONLY)
DATE OF VISIT:  25    PATIENT NAME:  Kaitlin Ervin     YOB: 1995    REASON FOR VISIT:    Chief Complaint   Patient presents with    Pelvic Pain     Patient is being seen for pelvic exam.  She complains of pelvic cramping and pressure that has been present for the last few days.    Pt states she has been having irregular and long menses for about a year now.        HISTORY OF PRESENT ILLNESS:  Pt states that she continues to have heavy irreg cycles and pelvic pain; here for pap and pelvic exam; usn showed UTERUS:anteverted, homogenous echo pattern.  scar visualized.  ENDO:2.9mm  RT. OVARY:seen, simple cyst visualized measures 2.8 x 2.6 x 2.2 cm.  LT. OVARY:seen, cyst visualized measures 5.8 x 4.5 x 3.6 cm - complex area noted within cyst measures 1.2 x 1.0 x 0.7 cm, internal blood flow noted to area.     Disc'd ra tlh/poss bso; r/b/a reviewed; restrictions and recovery disc'd        Patient's last menstrual period was 2025 (exact date).  Vitals:    25 1424   BP: 110/64   BP Site: Left Upper Arm   Patient Position: Sitting   Weight: 70.8 kg (156 lb)   Height: 1.651 m (5' 5\")     Body mass index is 25.96 kg/m².  Allergies   Allergen Reactions    Other Itching     Hummus     Current Outpatient Medications   Medication Sig Dispense Refill    melatonin 3 MG TABS tablet Take 1 tablet by mouth daily      medroxyPROGESTERone (DEPO-PROVERA) 150 MG/ML injection Inject 1 mL into the muscle every 3 months       No current facility-administered medications for this visit.     Social History     Socioeconomic History    Marital status: Single     Spouse name: None    Number of children: None    Years of education: None    Highest education level: None   Tobacco Use    Smoking status: Former     Current packs/day: 0.00     Average packs/day: 0.3 packs/day for 0.5 years (0.1 ttl pk-yrs)     Types: Cigarettes     Start date: 12/10/2013     Quit date: 6/10/2014     Years since  skin change or tenderness.   HENT:      Head: Normocephalic and atraumatic.   Eyes:      Extraocular Movements: Extraocular movements intact.      Pupils: Pupils are equal, round, and reactive to light.   Cardiovascular:      Rate and Rhythm: Normal rate.   Pulmonary:      Effort: Pulmonary effort is normal.   Abdominal:      General: There is no distension.      Palpations: Abdomen is soft. There is no mass.      Tenderness: There is no abdominal tenderness.   Musculoskeletal:         General: Normal range of motion.      Cervical back: Normal range of motion.   Neurological:      General: No focal deficit present.      Mental Status: She is alert and oriented to person, place, and time.   Skin:     General: Skin is warm and dry.   Psychiatric:         Mood and Affect: Mood normal.         Behavior: Behavior normal.         Thought Content: Thought content normal.         Judgment: Judgment normal.       The patient, Kaitlin Ervin is a 29 y.o. female, was seen with a total time spent of 20 minutes for the visit on this date of service by the E/M provider. The time component had both face to face and non face to face time spent in determining the total time component.  Counseling and education regarding her diagnosis listed below and her options regarding those diagnoses were also included in determining her time component.      Diagnosis Orders   1. Irregular menstrual cycle        2. Complex cyst of left ovary        3. Menorrhagia with irregular cycle             The patient had her preventative health maintenance recommendations and follow-up reviewed with her at the completion of her visit.    ASSESSMENT:      1. Irregular menstrual cycle    2. Complex cyst of left ovary    3. Menorrhagia with irregular cycle        PLAN:  No orders of the defined types were placed in this encounter.    Return for RA TLH/poss BSO.       Electronically signed by Manda Rosenberg DO on 04/08/25

## 2025-04-08 NOTE — PROGRESS NOTES
DATE OF VISIT:  25    PATIENT NAME:  Kaitlin Ervin     YOB: 1995    REASON FOR VISIT:    Chief Complaint   Patient presents with    Pelvic Pain     Patient is being seen for pelvic exam.  She complains of pelvic cramping and pressure that has been present for the last few days.    Pt states she has been having irregular and long menses for about a year now.        HISTORY OF PRESENT ILLNESS:  Pt states that she continues to have heavy irreg cycles and pelvic pain; here for pap and pelvic exam; usn showed UTERUS:anteverted, homogenous echo pattern.  scar visualized.  ENDO:2.9mm  RT. OVARY:seen, simple cyst visualized measures 2.8 x 2.6 x 2.2 cm.  LT. OVARY:seen, cyst visualized measures 5.8 x 4.5 x 3.6 cm - complex area noted within cyst measures 1.2 x 1.0 x 0.7 cm, internal blood flow noted to area.     Disc'd ra tlh/poss bso; r/b/a reviewed; restrictions and recovery disc'd        Patient's last menstrual period was 2025 (exact date).  Vitals:    25 1424   BP: 110/64   BP Site: Left Upper Arm   Patient Position: Sitting   Weight: 70.8 kg (156 lb)   Height: 1.651 m (5' 5\")     Body mass index is 25.96 kg/m².  Allergies   Allergen Reactions    Other Itching     Hummus     Current Outpatient Medications   Medication Sig Dispense Refill    melatonin 3 MG TABS tablet Take 1 tablet by mouth daily      medroxyPROGESTERone (DEPO-PROVERA) 150 MG/ML injection Inject 1 mL into the muscle every 3 months       No current facility-administered medications for this visit.     Social History     Socioeconomic History    Marital status: Single     Spouse name: None    Number of children: None    Years of education: None    Highest education level: None   Tobacco Use    Smoking status: Former     Current packs/day: 0.00     Average packs/day: 0.3 packs/day for 0.5 years (0.1 ttl pk-yrs)     Types: Cigarettes     Start date: 12/10/2013     Quit date: 6/10/2014     Years since

## 2025-04-10 LAB
HPV I/H RISK 4 DNA CVX QL NAA+PROBE: NOT DETECTED
HPV SAMPLE: NORMAL
HPV, INTERPRETATION: NORMAL
HPV16 DNA CVX QL NAA+PROBE: NOT DETECTED
HPV18 DNA CVX QL NAA+PROBE: NOT DETECTED
SPECIMEN DESCRIPTION: NORMAL

## 2025-04-18 ENCOUNTER — ANESTHESIA EVENT (OUTPATIENT)
Dept: OPERATING ROOM | Age: 30
End: 2025-04-18
Payer: COMMERCIAL

## 2025-04-21 ENCOUNTER — RESULTS FOLLOW-UP (OUTPATIENT)
Dept: OBGYN | Age: 30
End: 2025-04-21

## 2025-04-21 ENCOUNTER — HOSPITAL ENCOUNTER (OUTPATIENT)
Age: 30
Setting detail: OUTPATIENT SURGERY
Discharge: HOME | End: 2025-04-21
Attending: OBSTETRICS & GYNECOLOGY | Admitting: OBSTETRICS & GYNECOLOGY
Payer: COMMERCIAL

## 2025-04-21 ENCOUNTER — ANESTHESIA (OUTPATIENT)
Dept: OPERATING ROOM | Age: 30
End: 2025-04-21
Payer: COMMERCIAL

## 2025-04-21 VITALS
SYSTOLIC BLOOD PRESSURE: 92 MMHG | DIASTOLIC BLOOD PRESSURE: 57 MMHG | BODY MASS INDEX: 24.69 KG/M2 | HEIGHT: 65 IN | HEART RATE: 73 BPM | RESPIRATION RATE: 13 BRPM | OXYGEN SATURATION: 98 % | WEIGHT: 148.2 LBS | TEMPERATURE: 97.9 F

## 2025-04-21 DIAGNOSIS — N92.1 MENORRHAGIA WITH IRREGULAR CYCLE: ICD-10-CM

## 2025-04-21 DIAGNOSIS — G89.18 POSTOPERATIVE PAIN: Primary | ICD-10-CM

## 2025-04-21 DIAGNOSIS — N83.292 COMPLEX CYST OF LEFT OVARY: ICD-10-CM

## 2025-04-21 PROBLEM — N73.6 ADHESIONS OF UTERUS: Status: ACTIVE | Noted: 2025-04-21

## 2025-04-21 PROBLEM — Z98.891 HX OF CESAREAN SECTION: Status: ACTIVE | Noted: 2025-04-21

## 2025-04-21 PROBLEM — N32.89 BLADDER ADHESIONS: Status: ACTIVE | Noted: 2025-04-21

## 2025-04-21 LAB
CYTOLOGY REPORT: NORMAL
HCG UR QL: NEGATIVE

## 2025-04-21 PROCEDURE — 88307 TISSUE EXAM BY PATHOLOGIST: CPT

## 2025-04-21 PROCEDURE — 3600000019 HC SURGERY ROBOT ADDTL 15MIN: Performed by: OBSTETRICS & GYNECOLOGY

## 2025-04-21 PROCEDURE — 6360000002 HC RX W HCPCS: Performed by: NURSE ANESTHETIST, CERTIFIED REGISTERED

## 2025-04-21 PROCEDURE — 81025 URINE PREGNANCY TEST: CPT

## 2025-04-21 PROCEDURE — 6370000000 HC RX 637 (ALT 250 FOR IP): Performed by: NURSE ANESTHETIST, CERTIFIED REGISTERED

## 2025-04-21 PROCEDURE — 2709999900 HC NON-CHARGEABLE SUPPLY: Performed by: OBSTETRICS & GYNECOLOGY

## 2025-04-21 PROCEDURE — 6360000002 HC RX W HCPCS

## 2025-04-21 PROCEDURE — 7100000011 HC PHASE II RECOVERY - ADDTL 15 MIN: Performed by: OBSTETRICS & GYNECOLOGY

## 2025-04-21 PROCEDURE — 2500000003 HC RX 250 WO HCPCS: Performed by: NURSE ANESTHETIST, CERTIFIED REGISTERED

## 2025-04-21 PROCEDURE — 2580000003 HC RX 258: Performed by: NURSE ANESTHETIST, CERTIFIED REGISTERED

## 2025-04-21 PROCEDURE — 7100000001 HC PACU RECOVERY - ADDTL 15 MIN: Performed by: OBSTETRICS & GYNECOLOGY

## 2025-04-21 PROCEDURE — 64488 TAP BLOCK BI INJECTION: CPT | Performed by: NURSE ANESTHETIST, CERTIFIED REGISTERED

## 2025-04-21 PROCEDURE — 3700000001 HC ADD 15 MINUTES (ANESTHESIA): Performed by: OBSTETRICS & GYNECOLOGY

## 2025-04-21 PROCEDURE — 3700000000 HC ANESTHESIA ATTENDED CARE: Performed by: OBSTETRICS & GYNECOLOGY

## 2025-04-21 PROCEDURE — 7100000010 HC PHASE II RECOVERY - FIRST 15 MIN: Performed by: OBSTETRICS & GYNECOLOGY

## 2025-04-21 PROCEDURE — 2580000003 HC RX 258

## 2025-04-21 PROCEDURE — 3600000009 HC SURGERY ROBOT BASE: Performed by: OBSTETRICS & GYNECOLOGY

## 2025-04-21 PROCEDURE — 6360000002 HC RX W HCPCS: Performed by: OBSTETRICS & GYNECOLOGY

## 2025-04-21 PROCEDURE — S2900 ROBOTIC SURGICAL SYSTEM: HCPCS | Performed by: OBSTETRICS & GYNECOLOGY

## 2025-04-21 PROCEDURE — 7100000000 HC PACU RECOVERY - FIRST 15 MIN: Performed by: OBSTETRICS & GYNECOLOGY

## 2025-04-21 PROCEDURE — 58571 TLH W/T/O 250 G OR LESS: CPT | Performed by: OBSTETRICS & GYNECOLOGY

## 2025-04-21 RX ORDER — ONDANSETRON 2 MG/ML
INJECTION INTRAMUSCULAR; INTRAVENOUS
Status: DISCONTINUED | OUTPATIENT
Start: 2025-04-21 | End: 2025-04-21 | Stop reason: SDUPTHER

## 2025-04-21 RX ORDER — HYDROMORPHONE HYDROCHLORIDE 1 MG/ML
0.5 INJECTION, SOLUTION INTRAMUSCULAR; INTRAVENOUS; SUBCUTANEOUS EVERY 5 MIN PRN
Status: COMPLETED | OUTPATIENT
Start: 2025-04-21 | End: 2025-04-21

## 2025-04-21 RX ORDER — SODIUM CHLORIDE 0.9 % (FLUSH) 0.9 %
5-40 SYRINGE (ML) INJECTION PRN
Status: DISCONTINUED | OUTPATIENT
Start: 2025-04-21 | End: 2025-04-21 | Stop reason: HOSPADM

## 2025-04-21 RX ORDER — FENTANYL CITRATE 50 UG/ML
25 INJECTION, SOLUTION INTRAMUSCULAR; INTRAVENOUS EVERY 5 MIN PRN
Status: DISCONTINUED | OUTPATIENT
Start: 2025-04-21 | End: 2025-04-21 | Stop reason: HOSPADM

## 2025-04-21 RX ORDER — DROPERIDOL 2.5 MG/ML
0.62 INJECTION, SOLUTION INTRAMUSCULAR; INTRAVENOUS
Status: DISCONTINUED | OUTPATIENT
Start: 2025-04-21 | End: 2025-04-21 | Stop reason: HOSPADM

## 2025-04-21 RX ORDER — MEPERIDINE HYDROCHLORIDE 25 MG/ML
12.5 INJECTION INTRAMUSCULAR; INTRAVENOUS; SUBCUTANEOUS EVERY 5 MIN PRN
Status: DISCONTINUED | OUTPATIENT
Start: 2025-04-21 | End: 2025-04-21 | Stop reason: HOSPADM

## 2025-04-21 RX ORDER — SODIUM CHLORIDE, SODIUM LACTATE, POTASSIUM CHLORIDE, CALCIUM CHLORIDE 600; 310; 30; 20 MG/100ML; MG/100ML; MG/100ML; MG/100ML
INJECTION, SOLUTION INTRAVENOUS CONTINUOUS
Status: DISCONTINUED | OUTPATIENT
Start: 2025-04-21 | End: 2025-04-21 | Stop reason: HOSPADM

## 2025-04-21 RX ORDER — HYDROCODONE BITARTRATE AND ACETAMINOPHEN 5; 325 MG/1; MG/1
1 TABLET ORAL EVERY 6 HOURS PRN
Status: DISCONTINUED | OUTPATIENT
Start: 2025-04-21 | End: 2025-04-21 | Stop reason: HOSPADM

## 2025-04-21 RX ORDER — DEXAMETHASONE SODIUM PHOSPHATE 10 MG/ML
INJECTION, SOLUTION INTRAMUSCULAR; INTRAVENOUS
Status: DISCONTINUED | OUTPATIENT
Start: 2025-04-21 | End: 2025-04-21 | Stop reason: SDUPTHER

## 2025-04-21 RX ORDER — MIDAZOLAM HYDROCHLORIDE 2 MG/2ML
2 INJECTION, SOLUTION INTRAMUSCULAR; INTRAVENOUS
Status: DISCONTINUED | OUTPATIENT
Start: 2025-04-21 | End: 2025-04-21 | Stop reason: HOSPADM

## 2025-04-21 RX ORDER — SODIUM CHLORIDE 0.9 % (FLUSH) 0.9 %
5-40 SYRINGE (ML) INJECTION EVERY 12 HOURS SCHEDULED
Status: DISCONTINUED | OUTPATIENT
Start: 2025-04-21 | End: 2025-04-21 | Stop reason: HOSPADM

## 2025-04-21 RX ORDER — KETOROLAC TROMETHAMINE 10 MG/1
10 TABLET, FILM COATED ORAL EVERY 6 HOURS PRN
Qty: 20 TABLET | Refills: 0 | Status: SHIPPED | OUTPATIENT
Start: 2025-04-21 | End: 2026-04-21

## 2025-04-21 RX ORDER — ROCURONIUM BROMIDE 10 MG/ML
INJECTION, SOLUTION INTRAVENOUS
Status: DISCONTINUED | OUTPATIENT
Start: 2025-04-21 | End: 2025-04-21 | Stop reason: SDUPTHER

## 2025-04-21 RX ORDER — ROPIVACAINE HYDROCHLORIDE 5 MG/ML
INJECTION, SOLUTION EPIDURAL; INFILTRATION; PERINEURAL
Status: DISCONTINUED | OUTPATIENT
Start: 2025-04-21 | End: 2025-04-21 | Stop reason: SDUPTHER

## 2025-04-21 RX ORDER — METRONIDAZOLE 500 MG/100ML
500 INJECTION, SOLUTION INTRAVENOUS ONCE
Status: COMPLETED | OUTPATIENT
Start: 2025-04-21 | End: 2025-04-21

## 2025-04-21 RX ORDER — KETOROLAC TROMETHAMINE 30 MG/ML
INJECTION, SOLUTION INTRAMUSCULAR; INTRAVENOUS
Status: DISCONTINUED | OUTPATIENT
Start: 2025-04-21 | End: 2025-04-21 | Stop reason: SDUPTHER

## 2025-04-21 RX ORDER — MIDAZOLAM HYDROCHLORIDE 1 MG/ML
INJECTION, SOLUTION INTRAMUSCULAR; INTRAVENOUS
Status: DISCONTINUED | OUTPATIENT
Start: 2025-04-21 | End: 2025-04-21 | Stop reason: SDUPTHER

## 2025-04-21 RX ORDER — PROCHLORPERAZINE EDISYLATE 5 MG/ML
5 INJECTION INTRAMUSCULAR; INTRAVENOUS
Status: DISCONTINUED | OUTPATIENT
Start: 2025-04-21 | End: 2025-04-21 | Stop reason: HOSPADM

## 2025-04-21 RX ORDER — HYDROCODONE BITARTRATE AND ACETAMINOPHEN 5; 325 MG/1; MG/1
1 TABLET ORAL EVERY 4 HOURS PRN
Qty: 10 TABLET | Refills: 0 | Status: SHIPPED | OUTPATIENT
Start: 2025-04-21 | End: 2025-04-24

## 2025-04-21 RX ORDER — SODIUM CHLORIDE 9 MG/ML
INJECTION, SOLUTION INTRAVENOUS PRN
Status: DISCONTINUED | OUTPATIENT
Start: 2025-04-21 | End: 2025-04-21 | Stop reason: HOSPADM

## 2025-04-21 RX ORDER — PROPOFOL 10 MG/ML
INJECTION, EMULSION INTRAVENOUS
Status: DISCONTINUED | OUTPATIENT
Start: 2025-04-21 | End: 2025-04-21 | Stop reason: SDUPTHER

## 2025-04-21 RX ORDER — ENOXAPARIN SODIUM 100 MG/ML
40 INJECTION SUBCUTANEOUS ONCE
Status: COMPLETED | OUTPATIENT
Start: 2025-04-21 | End: 2025-04-21

## 2025-04-21 RX ORDER — DIMENHYDRINATE 50 MG
50 TABLET ORAL ONCE
Status: COMPLETED | OUTPATIENT
Start: 2025-04-21 | End: 2025-04-21

## 2025-04-21 RX ORDER — NALOXONE HYDROCHLORIDE 0.4 MG/ML
INJECTION, SOLUTION INTRAMUSCULAR; INTRAVENOUS; SUBCUTANEOUS PRN
Status: DISCONTINUED | OUTPATIENT
Start: 2025-04-21 | End: 2025-04-21 | Stop reason: HOSPADM

## 2025-04-21 RX ORDER — SODIUM CHLORIDE 9 MG/ML
INJECTION, SOLUTION INTRAMUSCULAR; INTRAVENOUS; SUBCUTANEOUS
Status: DISCONTINUED | OUTPATIENT
Start: 2025-04-21 | End: 2025-04-21 | Stop reason: SDUPTHER

## 2025-04-21 RX ORDER — FENTANYL CITRATE 50 UG/ML
INJECTION, SOLUTION INTRAMUSCULAR; INTRAVENOUS
Status: DISCONTINUED | OUTPATIENT
Start: 2025-04-21 | End: 2025-04-21 | Stop reason: SDUPTHER

## 2025-04-21 RX ORDER — ACETAMINOPHEN 325 MG/1
650 TABLET ORAL ONCE
Status: COMPLETED | OUTPATIENT
Start: 2025-04-21 | End: 2025-04-21

## 2025-04-21 RX ORDER — LIDOCAINE HYDROCHLORIDE 20 MG/ML
INJECTION, SOLUTION EPIDURAL; INFILTRATION; INTRACAUDAL; PERINEURAL
Status: DISCONTINUED | OUTPATIENT
Start: 2025-04-21 | End: 2025-04-21 | Stop reason: SDUPTHER

## 2025-04-21 RX ORDER — CEFAZOLIN SODIUM/WATER 2 G/20 ML
2000 SYRINGE (ML) INTRAVENOUS
Status: COMPLETED | OUTPATIENT
Start: 2025-04-21 | End: 2025-04-21

## 2025-04-21 RX ORDER — ACETAMINOPHEN 325 MG/1
650 TABLET ORAL
Status: DISCONTINUED | OUTPATIENT
Start: 2025-04-21 | End: 2025-04-21 | Stop reason: HOSPADM

## 2025-04-21 RX ADMIN — ONDANSETRON 4 MG: 2 INJECTION, SOLUTION INTRAMUSCULAR; INTRAVENOUS at 13:14

## 2025-04-21 RX ADMIN — Medication 2000 MG: at 11:33

## 2025-04-21 RX ADMIN — DEXAMETHASONE SODIUM PHOSPHATE 8 MG: 10 INJECTION INTRAMUSCULAR; INTRAVENOUS at 11:35

## 2025-04-21 RX ADMIN — DIMENHYDRINATE 50 MG: 50 TABLET ORAL at 09:44

## 2025-04-21 RX ADMIN — ROPIVACAINE HYDROCHLORIDE 40 ML: 5 INJECTION EPIDURAL; INFILTRATION; PERINEURAL at 10:12

## 2025-04-21 RX ADMIN — SODIUM CHLORIDE, POTASSIUM CHLORIDE, SODIUM LACTATE AND CALCIUM CHLORIDE: 600; 310; 30; 20 INJECTION, SOLUTION INTRAVENOUS at 12:11

## 2025-04-21 RX ADMIN — ACETAMINOPHEN 650 MG: 325 TABLET ORAL at 09:44

## 2025-04-21 RX ADMIN — SUGAMMADEX 150 MG: 100 INJECTION, SOLUTION INTRAVENOUS at 13:19

## 2025-04-21 RX ADMIN — ROCURONIUM BROMIDE 50 MG: 50 INJECTION INTRAVENOUS at 11:23

## 2025-04-21 RX ADMIN — METRONIDAZOLE 500 MG: 500 INJECTION, SOLUTION INTRAVENOUS at 11:39

## 2025-04-21 RX ADMIN — HYDROMORPHONE HYDROCHLORIDE 0.5 MG: 1 INJECTION, SOLUTION INTRAMUSCULAR; INTRAVENOUS; SUBCUTANEOUS at 13:50

## 2025-04-21 RX ADMIN — SODIUM CHLORIDE 40 ML: 9 INJECTION, SOLUTION INTRAMUSCULAR; INTRAVENOUS; SUBCUTANEOUS at 10:12

## 2025-04-21 RX ADMIN — DEXAMETHASONE SODIUM PHOSPHATE 10 MG: 10 INJECTION INTRAMUSCULAR; INTRAVENOUS at 10:12

## 2025-04-21 RX ADMIN — SODIUM CHLORIDE, POTASSIUM CHLORIDE, SODIUM LACTATE AND CALCIUM CHLORIDE: 600; 310; 30; 20 INJECTION, SOLUTION INTRAVENOUS at 09:58

## 2025-04-21 RX ADMIN — FENTANYL CITRATE 100 MCG: 50 INJECTION INTRAMUSCULAR; INTRAVENOUS at 11:23

## 2025-04-21 RX ADMIN — MIDAZOLAM 2 MG: 1 INJECTION INTRAMUSCULAR; INTRAVENOUS at 11:19

## 2025-04-21 RX ADMIN — PROPOFOL 200 MG: 10 INJECTION, EMULSION INTRAVENOUS at 11:23

## 2025-04-21 RX ADMIN — MIDAZOLAM 2 MG: 1 INJECTION INTRAMUSCULAR; INTRAVENOUS at 09:55

## 2025-04-21 RX ADMIN — HYDROCODONE BITARTRATE AND ACETAMINOPHEN 1 TABLET: 5; 325 TABLET ORAL at 14:24

## 2025-04-21 RX ADMIN — FENTANYL CITRATE 50 MCG: 50 INJECTION INTRAMUSCULAR; INTRAVENOUS at 12:25

## 2025-04-21 RX ADMIN — KETOROLAC TROMETHAMINE 30 MG: 30 INJECTION, SOLUTION INTRAMUSCULAR at 13:14

## 2025-04-21 RX ADMIN — LIDOCAINE HYDROCHLORIDE 60 MG: 20 INJECTION, SOLUTION EPIDURAL; INFILTRATION; INTRACAUDAL; PERINEURAL at 11:23

## 2025-04-21 RX ADMIN — FENTANYL CITRATE 100 MCG: 50 INJECTION INTRAMUSCULAR; INTRAVENOUS at 09:55

## 2025-04-21 RX ADMIN — ROCURONIUM BROMIDE 10 MG: 50 INJECTION INTRAVENOUS at 12:25

## 2025-04-21 RX ADMIN — ROCURONIUM BROMIDE 10 MG: 50 INJECTION INTRAVENOUS at 12:54

## 2025-04-21 RX ADMIN — HYDROMORPHONE HYDROCHLORIDE 0.5 MG: 1 INJECTION, SOLUTION INTRAMUSCULAR; INTRAVENOUS; SUBCUTANEOUS at 13:44

## 2025-04-21 RX ADMIN — ENOXAPARIN SODIUM 40 MG: 100 INJECTION SUBCUTANEOUS at 09:44

## 2025-04-21 RX ADMIN — FENTANYL CITRATE 50 MCG: 50 INJECTION INTRAMUSCULAR; INTRAVENOUS at 12:46

## 2025-04-21 ASSESSMENT — PAIN - FUNCTIONAL ASSESSMENT
PAIN_FUNCTIONAL_ASSESSMENT: 0-10

## 2025-04-21 ASSESSMENT — PAIN DESCRIPTION - DESCRIPTORS
DESCRIPTORS: DISCOMFORT;CRAMPING;BURNING
DESCRIPTORS: DISCOMFORT;CRAMPING;PRESSURE
DESCRIPTORS: CRAMPING;PRESSURE;DISCOMFORT
DESCRIPTORS: DISCOMFORT;CRAMPING;PRESSURE
DESCRIPTORS: CRAMPING;DISCOMFORT;PRESSURE

## 2025-04-21 NOTE — ANESTHESIA PRE PROCEDURE
Department of Anesthesiology  Preprocedure Note       Name:  Kaitlin Ervin   Age:  30 y.o.  :  1995                                          MRN:  [id number]         Date:  2025      Surgeon: Surgeon(s):  Manda Rosenberg DO    Procedure: Procedure(s):  HYSTERECTOMY VAGINAL LAPAROSCOPIC ROBOTIC ASSISTED - Possible Bilateral Salping-Oophorectomy, possible Laparoscopic Colpopexy    Medications prior to admission:   Prior to Admission medications    Medication Sig Start Date End Date Taking? Authorizing Provider   melatonin 3 MG TABS tablet Take 1 tablet by mouth daily   Yes Provider, MD Carmen   medroxyPROGESTERone (DEPO-PROVERA) 150 MG/ML injection Inject 1 mL into the muscle every 3 months    Provider, MD Carmen       Current medications:    Current Facility-Administered Medications   Medication Dose Route Frequency Provider Last Rate Last Admin   • lactated ringers infusion   IntraVENous Continuous Devi Lopez APRN - CRNA       • sodium chloride flush 0.9 % injection 5-40 mL  5-40 mL IntraVENous 2 times per day Mayra Mejias PA-C       • sodium chloride flush 0.9 % injection 5-40 mL  5-40 mL IntraVENous PRN Mayra Mejias PA-C       • 0.9 % sodium chloride infusion   IntraVENous PRN Mayra Mejias PA-C       • ceFAZolin (ANCEF) 2000 mg in 20 mL IV syringe  2,000 mg IntraVENous On Call to OR Mayra Mejias PA-C           Allergies:    Allergies   Allergen Reactions   • Other Itching     Hummus       Problem List:    Patient Active Problem List   Diagnosis Code   • Dysmenorrhea N94.6   • Status post repeat low transverse  section Z98.891   • 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

## 2025-04-21 NOTE — OP NOTE
Preoperative diagnosis: 1.  Chronic pelvic pain  2. Menorrhagia   3. Left adnexal cyst (presumed ovarian)  4. History of 5 prior  sections    Postoperative diagnosis: Same path pending +   5. Uterine adhesions  6. Bladder adhesions    Procedure:  Robotic-assisted Total Laparoscopic Hysterectomy with Bilateral Salpingectomy  Lysis of Adhesions    Surgeon: Dr. Manda Kaur    Asst.: Mayra Gross PGY2    Anesthesia: Gen.    Estimated blood loss: 25 mL    Fluids: LR    Urine: 400 mL of clear urine    Condition: Stable    Complications: None    Findings: The patient had an anteverted uterus which sounded to 10 cm in depth.  The uterus was densely adhered to the anterior abdominal wall from the fundus down - off to the left of midline including the left round ligament, uterus, bladder, and anterior abdominal wall.  The left tube was grossly distended with hydrosalpinx and there may have been a left serous ovarian cyst with the tube as it was removed.  Otherwise both ovaries were grossly within normal limits as was the right tube.  Bilateral ureteral peristalsis was noted throughout the case and after closure of the vaginal cuff.    Specimen removed: Uterus and Bilateral tubes    Operative note: The patient was taken to the operating room where general anesthesia was obtained without difficulty.  She was prepped and draped in the usual sterile fashion in a dorsal lithotomy position.  Timeout was performed.  A weighted speculum was placed in the patient's vagina and the anterior lip of the cervix was grasped with a single-tooth tenaculum.  The cervix was progressively dilated and the uterus was sounded with the findings noted above.  A V care uterine manipulator was then placed.  A Cardoza catheter was placed and left to gravity drainage.  At this point attention was turned to the patient's abdomen where a supraumbilical incision was made with a scalpel.  An 8 mm skin incision was  Dissection continued along the broad ligament until the round ligament was ligated and transected.  The bladder flap was then taken down from the right until the lateral third was free.  Attention was then again focused on the uterine adhesions.  Taking care to keep the bladder safe, these adhesions were taken down with sharp dissection.  The round ligament was then free and able to be ligated and transected.  The bladder flap was then taken down on the left toward the midline.  We switched back and forth from right to left until the bladder was freed from the lower uterine segment and cervix.  The uterine arteries were skeletonized, ligated, and transected bilaterally.  The uterosacral and cardinal ligaments were ligated and transected.  Then the scissors were used to amputate the uterus was amputated just beneath the cervix using the groove of the V care as a guide.  The uterus and left tube were then withdrawn through the vagina and a sponge stick was placed to maintain pneumoperitoneum.    The vaginal cuff was then closed with 2-0 V lock suture working from right to left.  Copious amounts of irrigation were then used to evaluate the cuff and pedicles to assure hemostasis.  Bilateral ureteral peristalsis was again noted.  At this point the instruments removed from the abdomen.  The abdomen was allowed to desufflate.  The skin incisions were closed with 4-0 Monocryl in a subcuticular fashion.  The uterus and bilateral tubes were handed off to pathology.  The vaginal cuff was then examined with no evidence of bleeding.  The Cardoza catheter was removed.  Sponge, lap, needle, and instrument counts were correct × 2 and the patient was taken to the recovery area in apparently stable condition.    Due to the dense adhesions we spent an additional 30 plus minutes of time on the hysterectomy.  This increased our operating time by 50%.     Surgical Assistant documentation:    An assistant surgeon was required with this

## 2025-04-21 NOTE — ANESTHESIA POSTPROCEDURE EVALUATION
Department of Anesthesiology  Postprocedure Note    Patient: Kaitlin Ervin  MRN: [id number]  YOB: 1995  Date of evaluation: 4/21/2025    Procedure Summary       Date: 04/21/25 Room / Location: [address]    Anesthesia Start: 1119 Anesthesia Stop: 1332    Procedure: HYSTERECTOMY VAGINAL LAPAROSCOPIC ROBOTIC ASSISTED - Bilateral Salpingectomy Diagnosis:       Menorrhagia with irregular cycle      Complex cyst of left ovary      (Menorrhagia with irregular cycle [N92.1])      (Complex cyst of left ovary [N83.292])    Surgeons: Manda Rosenberg DO Responsible Provider: Johnny Owusu APRN - CRNA    Anesthesia Type: general ASA Status: 1            Anesthesia Type: No value filed.    Marge Phase I: Marge Score: 10    Marge Phase II: Marge Score: 10    Anesthesia Post Evaluation    Patient location during evaluation: bedside  Patient participation: complete - patient participated  Level of consciousness: awake and alert  Pain score: 4  Airway patency: patent  Nausea & Vomiting: no nausea and no vomiting  Cardiovascular status: hemodynamically stable  Respiratory status: acceptable  Hydration status: stable  Pain management: adequate        No notable events documented.

## 2025-04-21 NOTE — PROGRESS NOTES
Discharge Criteria    Inpatients must meet Criteria 1 through 7. All other patients are either YES or N/A. If a NO is chosen then Anesthesia or Surgeon must be notified.      1.  Minimum 30 minutes after last dose of sedative medication.    Yes      2.  Systolic BP between 90 - 160. Diastolic BP between 60 - 90.    No-CRNA aware      3.  Pulse between 60 - 120    Yes      4.  Respirations between 8 - 25.    Yes      5.  SpO2 92% - 100%.    Yes      6.  Able to cough and swallow or return to baseline function.    yes      7.  Alert and oriented or return to baseline mental status.    Yes      8.  Demonstrates controlled, coordinated movements, ambulates with steady gait, or return to baseline activity function.    Yes      9.  Minimal or no pain or nausea, or at a level tolerable and acceptable to patient.    Yes      10. Takes and retains oral fluids as allowed.    Yes      11. Procedural / perioperative site stable.  Minimal or no bleeding.    Yes          12. If GI endoscopy procedure, minimal or no abdominal distention or passing flatus.    Yes      13. Written discharge instructions and emergency telephone number provided.    Yes      14. Accompanied by a responsible adult.    Yes-boyfriendCharly

## 2025-04-21 NOTE — ANESTHESIA PROCEDURE NOTES
Peripheral Block    Patient location during procedure: pre-op  Reason for block: post-op pain management and at surgeon's request  Start time: 4/21/2025 10:05 AM  End time: 4/21/2025 10:12 AM  Staffing  Performed: other anesthesia staff and resident/CRNA   Resident/CRNA: Demond Kumar APRN - CRNA  Other anesthesia staff: Michelle Cabral  Performed by: Michelle Cabral  Authorized by: Demond Kumar APRN - CRNA    Preanesthetic Checklist  Completed: patient identified, IV checked, site marked, risks and benefits discussed, surgical/procedural consents, equipment checked, pre-op evaluation, timeout performed, anesthesia consent given, oxygen available, monitors applied/VS acknowledged, fire risk safety assessment completed and verbalized and blood product R/B/A discussed and consented  Peripheral Block   Patient position: supine  Prep: ChloraPrep  Provider prep: mask and sterile gloves  Patient monitoring: continuous pulse ox  Block type: Rectus sheath and TAP  Laterality: bilateral  Injection technique: single-shot  Guidance: ultrasound guided  Anesthesia block local: See MAR for Medications administered.  Anesthesia block local: See MAR for Medications administered.    Needle   Needle type: Other   Needle gauge: 22 G  Needle localization: ultrasound guidance  Needle length: 8 cmOther needle type: pajunk  Assessment   Injection assessment: negative aspiration for heme, no paresthesia on injection and no intravascular symptoms  Paresthesia pain: none  Slow fractionated injection: yes  Hemodynamics: stable  Outcomes: uncomplicated and patient tolerated procedure well    Additional Notes  40CC 0.5% Ropivicaine (with 10mg Decadron) and 0.9% Normal Saline

## 2025-04-28 LAB — SURGICAL PATHOLOGY REPORT: NORMAL

## 2025-05-14 ENCOUNTER — OFFICE VISIT (OUTPATIENT)
Dept: OBGYN | Age: 30
End: 2025-05-14

## 2025-05-14 VITALS
DIASTOLIC BLOOD PRESSURE: 66 MMHG | HEIGHT: 65 IN | SYSTOLIC BLOOD PRESSURE: 110 MMHG | BODY MASS INDEX: 23.99 KG/M2 | WEIGHT: 144 LBS

## 2025-05-14 DIAGNOSIS — Z09 POSTOPERATIVE EXAMINATION: Primary | ICD-10-CM

## 2025-05-14 PROCEDURE — 99024 POSTOP FOLLOW-UP VISIT: CPT

## 2025-05-14 RX ORDER — METRONIDAZOLE 500 MG/1
500 TABLET ORAL 2 TIMES DAILY
Qty: 14 TABLET | Refills: 0 | Status: SHIPPED | OUTPATIENT
Start: 2025-05-14 | End: 2025-05-21

## 2025-05-14 NOTE — PROGRESS NOTES
Postop Progress Note    Subjective    Kaitlin Ervin presents to the office for postop follow up.  Chief Complaint   Patient presents with    Post-Op Check     Patient is being seen for 2 week pot op after Robotic-assisted Total Laparoscopic Hysterectomy with Bilateral Salpingectomy, Lysis of Adhesions. Patient states that she has been tender at one of her incisions on her left side.  She also notes that her bowel movements have not returned to normal.  Denies any vaginal bleeding.        Objective    Vitals:    05/14/25 0854   BP: 110/66     General: alert, cooperative and no distress  Incision: healing well    Assessment  Doing well postoperatively.  Reports that bowel movements have not returned to normal - not necessarily constipated but does not feel like \"input equals output.\"  Encouraged to continue with probiotics, fiber.  Denies issues with urination.  Reports that left incision is more tender, more raised.  Discussed that it will flatten out when suture dissolves.  Denies abnormal bleeding, discharge.  Doing well with restrictions but does report that she has been back in gym since first week post-op.  Doing stair stepper and walking.  Encouraged to adhere to low-impact cardio only and to continue to avoid lifting as these can increase risk of poor healing, tearing, dehiscence.  Aware of normal pathology report except chronic cervicitis - Flagyl sent.      Plan  1. Continue any current medications  2. Wound care discussed  3. Pt is to continue with activity restrictions   4. Usual diet  5. Follow up: 2 weeks     Electronically signed by Mayra Mejias PA-C on 5/14/2025 at 10:03 AM

## 2025-06-03 ENCOUNTER — OFFICE VISIT (OUTPATIENT)
Dept: OBGYN | Age: 30
End: 2025-06-03

## 2025-06-03 VITALS
WEIGHT: 143 LBS | HEIGHT: 65 IN | DIASTOLIC BLOOD PRESSURE: 62 MMHG | BODY MASS INDEX: 23.82 KG/M2 | SYSTOLIC BLOOD PRESSURE: 102 MMHG

## 2025-06-03 DIAGNOSIS — Z09 POSTOPERATIVE EXAMINATION: Primary | ICD-10-CM

## 2025-06-03 PROCEDURE — 99024 POSTOP FOLLOW-UP VISIT: CPT | Performed by: OBSTETRICS & GYNECOLOGY

## 2025-06-03 NOTE — PROGRESS NOTES
Postop Progress Note    Subjective    Kaitlin Ervin presents to the office for postop follow up.  Chief Complaint   Patient presents with    Post-Op Check     Patient is being seen for 6 week post op after Robotic-assisted Total Laparoscopic Hysterectomy with Bilateral Salpingectomy, Lysis of Adhesions 4/21/25.  She denies any issues at this time.            Objective    Vitals:    06/03/25 1107   BP: 102/62     General: alert, cooperative and no distress  Incision: healing well, cuff well supported and intact    Assessment  Doing well postoperatively.    Plan  1. Continue any current medications  2. Wound care discussed  3. Pt is to increase activities as tolerated after 8 weeks  4. Usual diet  5. Follow up: as needed    Electronically signed by Manda Rosenberg DO on 6/3/2025 at 11:12 AM

## 2025-06-17 ENCOUNTER — TELEPHONE (OUTPATIENT)
Dept: OBGYN | Age: 30
End: 2025-06-17

## 2025-06-17 ENCOUNTER — OFFICE VISIT (OUTPATIENT)
Dept: OBGYN CLINIC | Age: 30
End: 2025-06-17

## 2025-06-17 ENCOUNTER — HOSPITAL ENCOUNTER (OUTPATIENT)
Age: 30
Setting detail: SPECIMEN
Discharge: HOME | End: 2025-06-17

## 2025-06-17 VITALS — SYSTOLIC BLOOD PRESSURE: 100 MMHG | BODY MASS INDEX: 24.33 KG/M2 | DIASTOLIC BLOOD PRESSURE: 62 MMHG | WEIGHT: 146.2 LBS

## 2025-06-17 DIAGNOSIS — N93.9 VAGINAL BLEEDING: ICD-10-CM

## 2025-06-17 DIAGNOSIS — Z09 POSTOPERATIVE EXAMINATION: ICD-10-CM

## 2025-06-17 DIAGNOSIS — N93.9 VAGINAL BLEEDING: Primary | ICD-10-CM

## 2025-06-17 LAB
CANDIDA SPECIES: POSITIVE
GARDNERELLA VAGINALIS: NEGATIVE
SOURCE: ABNORMAL
TRICHOMONAS: NEGATIVE

## 2025-06-17 PROCEDURE — 99024 POSTOP FOLLOW-UP VISIT: CPT

## 2025-06-17 NOTE — TELEPHONE ENCOUNTER
Pt called in and stated she is having vaginal bleeding, started last night it was heavy, this morning is more spotty, its not constantly coming out like a period.    Color is bright red. Pt did also have discharge this morning.     Pt had TLH on 4/21/25, final PO on 6/3/25. Pt did still have some stitches that were visible at this PO but everything was WNL .

## 2025-06-17 NOTE — PROGRESS NOTES
Postop Progress Note    Subjective    Kaitlin Ervin presents to the office for postop follow up.  Chief Complaint   Patient presents with    Vaginal Bleeding     Vaginal bleeding S/P TLH. Had TLH on 4/21/25. Had bright red heavy bleeding last PM with just spotting noted today.      Objective    Vitals:    06/17/25 1608   BP: 100/62     General: alert, cooperative and no distress  Incision: healing well    Assessment  Doing well postoperatively overall.  She is 8 weeks s/p TLH.  Reports heavy bleeding yesterday.  Bleeding has significantly slowed and is just having spotting today.  She did have intercourse yesterday prior to bleeding.  Denies cramping, abnormal discharge.  Agreeable to vaginal cx today.  Reassured that cuff is intact.  Discussed likelihood of needing ABX pending cx.  Discussed pelvic rest x 2 weeks.    Plan  1. Continue any current medications  2. Wound care discussed  3. Pt is to continue with pelvic rest x 2 weeks  4. Usual diet  5. Follow up: as needed    Electronically signed by Mayra Mejias PA-C on 6/25/2025 at 12:02 PM

## 2025-06-19 ENCOUNTER — RESULTS FOLLOW-UP (OUTPATIENT)
Dept: OBGYN CLINIC | Age: 30
End: 2025-06-19

## 2025-06-19 RX ORDER — FLUCONAZOLE 150 MG/1
150 TABLET ORAL
Qty: 2 TABLET | Refills: 0 | Status: SHIPPED | OUTPATIENT
Start: 2025-06-19 | End: 2025-06-23

## 2025-08-20 ENCOUNTER — OFFICE VISIT (OUTPATIENT)
Dept: OBGYN | Age: 30
End: 2025-08-20
Payer: COMMERCIAL

## 2025-08-20 VITALS
SYSTOLIC BLOOD PRESSURE: 100 MMHG | DIASTOLIC BLOOD PRESSURE: 60 MMHG | HEIGHT: 66 IN | WEIGHT: 143 LBS | BODY MASS INDEX: 22.98 KG/M2

## 2025-08-20 DIAGNOSIS — Z01.419 WELL WOMAN EXAM WITH ROUTINE GYNECOLOGICAL EXAM: Primary | ICD-10-CM

## 2025-08-20 PROCEDURE — 99395 PREV VISIT EST AGE 18-39: CPT | Performed by: ADVANCED PRACTICE MIDWIFE

## (undated) DEVICE — DRAPE C ARM W/ POLY STRP W42XL72IN FOR MOB XR

## (undated) DEVICE — COVER LT HNDL BLU PLAS

## (undated) DEVICE — GLOVE SURG SZ 65 CRM LTX FREE POLYISOPRENE POLYMER BEAD ANTI

## (undated) DEVICE — SUTURE CHROMIC GUT SZ 1 L36IN ABSRB BRN L48MM CTX 1/2 CIR 905H

## (undated) DEVICE — ELECTRO LUBE IS A SINGLE PATIENT USE DEVICE THAT IS INTENDED TO BE USED ON ELECTROSURGICAL ELECTRODES TO REDUCE STICKING.: Brand: KEY SURGICAL ELECTRO LUBE

## (undated) DEVICE — AGENT HEMSTAT 3GM PURIFIED PLNT STARCH PWD ABSRB ARISTA AH

## (undated) DEVICE — SPONGE COUNTING BAG: Brand: DEVON

## (undated) DEVICE — GLOVE SURG SZ 65 L12IN FNGR THK79MIL GRN LTX FREE

## (undated) DEVICE — SUTURE PDS II SZ 0 L36IN ABSRB VLT L36MM CT-1 1/2 CIR Z346H

## (undated) DEVICE — PACK PROCEDURE SURG C SECT CUST STRL

## (undated) DEVICE — SYRINGE MED 10ML LUERLOCK TIP W/O SFTY DISP

## (undated) DEVICE — GOWN,SIRUS,POLYRNF,BRTHSLV,LG,30/CS: Brand: MEDLINE

## (undated) DEVICE — TRAY CATH 16FR F INCLUDE LUB DRNGE BG STATLOK STBL DEV

## (undated) DEVICE — SOLUTION IRRIG 1000ML 0.9% SOD CHL USP POUR PLAS BTL

## (undated) DEVICE — ELECTRODE ES AD CRD L15FT DISP FOR PT BELOW 30LB REM

## (undated) DEVICE — SUTURE VCRL SZ 4-0 L27IN ABSRB UD L60MM KS STR REV CUT NDL J662H

## (undated) DEVICE — SUTURE V-LOC 180 SZ 2-0 L9IN ABSRB GRN L27MM GS-22 1/2 CIR VLOCL2145

## (undated) DEVICE — TIP COVER ACCESSORY

## (undated) DEVICE — STAPLER EXT SKIN 35 WIDE S STL STPL SQUEEZE HNDL VISISTAT

## (undated) DEVICE — LABEL SYS CORR MED

## (undated) DEVICE — SILVER-COATED ANTIBACTERIAL BARRIER DRESSING: Brand: ACTICOAT SURGIC 10X25CM 5PK US

## (undated) DEVICE — SUTURE MCRYL SZ 3-0 L36IN ABSRB UD L36MM CT-1 1/2 CIR Y944H

## (undated) DEVICE — SUTURE MONOCRYL SZ 4-0 L27IN ABSRB UD L19MM PS-2 1/2 CIR PRIM Y426H

## (undated) DEVICE — INSUFFLATION NEEDLE TO ESTABLISH PNEUMOPERITONEUM.: Brand: INSUFFLATION NEEDLE

## (undated) DEVICE — SOLUTION IV 1000ML 0.9% SOD CHL FOR IRRIG PLAS CONT

## (undated) DEVICE — MTHZ GYN LAPAROSCOPY: Brand: MEDLINE INDUSTRIES, INC.

## (undated) DEVICE — BLADELESS OBTURATOR: Brand: WECK VISTA

## (undated) DEVICE — ENDOSCOPIC KIT CLN SWAB MICROFIBER CLTH SCP CLEANOR DISP

## (undated) DEVICE — GLOVE ORANGE PI 8   MSG9080

## (undated) DEVICE — SYRINGE 20ML LL S/C 50

## (undated) DEVICE — Z CONVERTED USE 2916815 PAD PT POS 36 IN SURGYPAD DISP

## (undated) DEVICE — Device

## (undated) DEVICE — CHLORAPREP 26ML ORANGE

## (undated) DEVICE — SEAL

## (undated) DEVICE — ARM DRAPE

## (undated) DEVICE — Device: Brand: UTERINE ELEVATOR PRO